# Patient Record
Sex: FEMALE | Race: OTHER | URBAN - METROPOLITAN AREA
[De-identification: names, ages, dates, MRNs, and addresses within clinical notes are randomized per-mention and may not be internally consistent; named-entity substitution may affect disease eponyms.]

---

## 2022-06-13 ENCOUNTER — HOSPITAL ENCOUNTER (OUTPATIENT)
Dept: LAB | Age: 27
Discharge: HOME | End: 2022-06-13

## 2022-06-13 ENCOUNTER — OFFICE VISIT (OUTPATIENT)
Dept: FAMILY MEDICINE CLINIC | Age: 27
End: 2022-06-13

## 2022-06-13 VITALS
TEMPERATURE: 97.8 F | BODY MASS INDEX: 33.31 KG/M2 | HEART RATE: 105 BPM | OXYGEN SATURATION: 99 % | HEIGHT: 62 IN | DIASTOLIC BLOOD PRESSURE: 76 MMHG | SYSTOLIC BLOOD PRESSURE: 137 MMHG | WEIGHT: 181 LBS

## 2022-06-13 DIAGNOSIS — K59.00 CONSTIPATION, UNSPECIFIED CONSTIPATION TYPE: ICD-10-CM

## 2022-06-13 DIAGNOSIS — R03.0 ELEVATED BLOOD PRESSURE READING: ICD-10-CM

## 2022-06-13 DIAGNOSIS — R53.83 FATIGUE, UNSPECIFIED TYPE: ICD-10-CM

## 2022-06-13 DIAGNOSIS — G43.909 MIGRAINE WITHOUT STATUS MIGRAINOSUS, NOT INTRACTABLE, UNSPECIFIED MIGRAINE TYPE: ICD-10-CM

## 2022-06-13 DIAGNOSIS — N91.2 AMENORRHEA: Primary | ICD-10-CM

## 2022-06-13 DIAGNOSIS — K21.9 GASTROESOPHAGEAL REFLUX DISEASE WITHOUT ESOPHAGITIS: ICD-10-CM

## 2022-06-13 DIAGNOSIS — M54.2 NECK PAIN: ICD-10-CM

## 2022-06-13 LAB
HCG URINE, QL. (POC): NEGATIVE
VALID INTERNAL CONTROL?: YES

## 2022-06-13 PROCEDURE — 81025 URINE PREGNANCY TEST: CPT | Performed by: FAMILY MEDICINE

## 2022-06-13 PROCEDURE — 80053 COMPREHEN METABOLIC PANEL: CPT

## 2022-06-13 PROCEDURE — 99204 OFFICE O/P NEW MOD 45 MIN: CPT | Performed by: FAMILY MEDICINE

## 2022-06-13 PROCEDURE — 84443 ASSAY THYROID STIM HORMONE: CPT

## 2022-06-13 PROCEDURE — 82306 VITAMIN D 25 HYDROXY: CPT

## 2022-06-13 PROCEDURE — 83036 HEMOGLOBIN GLYCOSYLATED A1C: CPT

## 2022-06-13 PROCEDURE — 85025 COMPLETE CBC W/AUTO DIFF WBC: CPT

## 2022-06-13 RX ORDER — CYCLOBENZAPRINE HCL 10 MG
10 TABLET ORAL
Qty: 14 TABLET | Refills: 0 | Status: SHIPPED | OUTPATIENT
Start: 2022-06-13 | End: 2022-10-26 | Stop reason: SDUPTHER

## 2022-06-13 RX ORDER — ATENOLOL 25 MG/1
25 TABLET ORAL DAILY
Qty: 30 TABLET | Refills: 0 | Status: SHIPPED | OUTPATIENT
Start: 2022-06-13 | End: 2022-10-26 | Stop reason: SDUPTHER

## 2022-06-13 RX ORDER — PANTOPRAZOLE SODIUM 40 MG/1
40 TABLET, DELAYED RELEASE ORAL
Qty: 30 TABLET | Refills: 0 | Status: SHIPPED | OUTPATIENT
Start: 2022-06-13 | End: 2022-09-20 | Stop reason: ALTCHOICE

## 2022-06-13 RX ORDER — POLYETHYLENE GLYCOL 3350 17 G/17G
17 POWDER, FOR SOLUTION ORAL DAILY
Qty: 510 G | Refills: 0 | Status: SHIPPED | OUTPATIENT
Start: 2022-06-13 | End: 2022-07-13

## 2022-06-13 NOTE — PROGRESS NOTES
Anne-Marie Hawk is a 23 y.o. female   Chief Complaint   Patient presents with    Headache     few years    Abdominal Pain     low area         ASSESSMENT AND PLAN:    1. Amenorrhea  6 month since last depo provera shot. HCG negative today. - AMB POC URINE PREGNANCY TEST, VISUAL COLOR COMPARISON    2. Migraine without status migrainosus, not intractable, unspecified migraine type  Suspect multifactorial headaches - does have migraneous features. Start atenolol for migraine prevention. Follow up in one month. - atenoloL (TENORMIN) 25 mg tablet; Take 1 Tablet by mouth daily. Indications: high blood pressure, migraine prevention  Dispense: 30 Tablet; Refill: 0    3. Elevated blood pressure reading  Mild elevation, so will choose beta blocker for migraine prevention for dual benefit. Discussed diet and exercise. - atenoloL (TENORMIN) 25 mg tablet; Take 1 Tablet by mouth daily. Indications: high blood pressure, migraine prevention  Dispense: 30 Tablet; Refill: 0    4. Neck pain  Also with significant neck tension and tightness which likely is contributing to her headache. Will give course of QHS flexeril. - cyclobenzaprine (FLEXERIL) 10 mg tablet; Take 1 Tablet by mouth nightly. Indications: muscle spasm  Dispense: 14 Tablet; Refill: 0    5. Gastroesophageal reflux disease without esophagitis  Reviewed diet changes. Start protonix. - pantoprazole (PROTONIX) 40 mg tablet; Take 1 Tablet by mouth Daily (before breakfast). Dispense: 30 Tablet; Refill: 0    6. Constipation, unspecified constipation type  Increase fiber and hydration. Miralax. - polyethylene glycol (MIRALAX) 17 gram/dose powder; Take 17 g by mouth daily for 30 days. Indications: constipation  Dispense: 510 g; Refill: 0    7. Fatigue, unspecified type  Labs today. Followup pending results. - CBC WITH AUTOMATED DIFF; Future  - HEMOGLOBIN A1C WITH EAG; Future  - METABOLIC PANEL, COMPREHENSIVE; Future  - TSH 3RD GENERATION;  Future  - VITAMIN D, 25 HYDROXY; Future      SUBJECTIVE:    HPI:  Kamilla Arias is a 23 y.o. female who presents with her friend and coworker with multiple symptoms. She has not been evaluated by a doctor basically since her daughter was born almost 5 years ago. She has multiple symptoms. The most bothersome is a daily headache that she has had for 3 years. She thinks stress and worry makes it worse. But also wonders if depo-provera is contributing. It is assocaited with nausea, photo and phonophobia, and blurry vision. She has occipital pain and neck pain -- but she also has unilateral throbbing pain. It changes side. She has also been very fatigued. She could fall asleep anywhere but doesn't sleep well at night. She has had lower abdominal pain, worse on the left side. She admits to constipation. She also has epigastric pain. She has occasional B/L chest pressure and palpitations. She admits she is an anxious person. She takes tylenol and ibuprofen for pain, which helps a little bit. PMH: none   PSH: none  MEDS: B12 injection last week. ALL: NKDA  SH: Denies  FH: GM: DM2  M: Headaches      Review of Systems   Constitutional: Positive for malaise/fatigue. Negative for fever and weight loss. Eyes: Positive for blurred vision and photophobia. Respiratory: Negative for cough and shortness of breath. Cardiovascular: Positive for palpitations. Negative for chest pain and leg swelling. Gastrointestinal: Positive for abdominal pain, constipation, heartburn and nausea. Negative for diarrhea and vomiting. Genitourinary: Negative for dysuria, flank pain, frequency, hematuria and urgency. Musculoskeletal: Positive for neck pain. Negative for back pain and myalgias. Neurological: Positive for headaches. Negative for dizziness, tingling and loss of consciousness. Psychiatric/Behavioral: The patient is nervous/anxious and has insomnia.           /76 (BP 1 Location: Left upper arm, BP Patient Position: Sitting)   Pulse (!) 105   Temp 97.8 °F (36.6 °C) (Temporal)   Ht 5' 2.21\" (1.58 m)   Wt 181 lb (82.1 kg)   SpO2 99%   BMI 32.89 kg/m²     Physical Exam  Constitutional:       General: She is not in acute distress. Appearance: Normal appearance. HENT:      Head: Normocephalic and atraumatic. Mouth/Throat:      Mouth: Mucous membranes are moist.      Pharynx: Oropharynx is clear. No oropharyngeal exudate or posterior oropharyngeal erythema. Eyes:      Extraocular Movements: Extraocular movements intact. Conjunctiva/sclera: Conjunctivae normal.      Pupils: Pupils are equal, round, and reactive to light. Cardiovascular:      Rate and Rhythm: Normal rate and regular rhythm. Pulses: Normal pulses. Heart sounds: Normal heart sounds. Pulmonary:      Effort: Pulmonary effort is normal.      Breath sounds: Normal breath sounds. Abdominal:      General: Bowel sounds are normal. There is no distension. Palpations: Abdomen is soft. Tenderness: There is no abdominal tenderness. Musculoskeletal:         General: Normal range of motion. Cervical back: No tenderness. Right lower leg: No edema. Left lower leg: No edema. Lymphadenopathy:      Cervical: No cervical adenopathy. Skin:     General: Skin is warm. Neurological:      Mental Status: She is alert and oriented to person, place, and time. Cranial Nerves: No cranial nerve deficit. Sensory: Sensation is intact. Motor: Motor function is intact. Coordination: Coordination is intact.       Gait: Gait normal.      Deep Tendon Reflexes: Reflexes normal.   Psychiatric:         Behavior: Behavior normal.

## 2022-06-13 NOTE — PROGRESS NOTES
An After Visit Summary was printed and given to the patient. Discharge medications reviewed with patient and appropriate educational materials and side effects teaching were provided. Lab req and directions to draw site provided to patient. Goodrx coupon provided and RN explained use. Time for questions and answers provided, patient verbalized understanding. Patient discharged from clinic in stable condition. MEAGHAN  assisted with this d/c.

## 2022-06-14 LAB
25(OH)D3 SERPL-MCNC: 25.5 NG/ML (ref 30–100)
ALBUMIN SERPL-MCNC: 4.2 G/DL (ref 3.5–5)
ALBUMIN/GLOB SERPL: 1.3 {RATIO} (ref 1.1–2.2)
ALP SERPL-CCNC: 98 U/L (ref 45–117)
ALT SERPL-CCNC: 26 U/L (ref 12–78)
ANION GAP SERPL CALC-SCNC: 8 MMOL/L (ref 5–15)
AST SERPL-CCNC: 22 U/L (ref 15–37)
BASOPHILS # BLD: 0 K/UL (ref 0–0.1)
BASOPHILS NFR BLD: 0 % (ref 0–1)
BILIRUB SERPL-MCNC: 0.3 MG/DL (ref 0.2–1)
BUN SERPL-MCNC: 10 MG/DL (ref 6–20)
BUN/CREAT SERPL: 13 (ref 12–20)
CALCIUM SERPL-MCNC: 9.2 MG/DL (ref 8.5–10.1)
CHLORIDE SERPL-SCNC: 109 MMOL/L (ref 97–108)
CO2 SERPL-SCNC: 24 MMOL/L (ref 21–32)
CREAT SERPL-MCNC: 0.79 MG/DL (ref 0.55–1.02)
DIFFERENTIAL METHOD BLD: NORMAL
EOSINOPHIL # BLD: 0.1 K/UL (ref 0–0.4)
EOSINOPHIL NFR BLD: 2 % (ref 0–7)
ERYTHROCYTE [DISTWIDTH] IN BLOOD BY AUTOMATED COUNT: 12.6 % (ref 11.5–14.5)
EST. AVERAGE GLUCOSE BLD GHB EST-MCNC: 94 MG/DL
GLOBULIN SER CALC-MCNC: 3.2 G/DL (ref 2–4)
GLUCOSE SERPL-MCNC: 89 MG/DL (ref 65–100)
HBA1C MFR BLD: 4.9 % (ref 4–5.6)
HCT VFR BLD AUTO: 41.7 % (ref 35–47)
HGB BLD-MCNC: 13.5 G/DL (ref 11.5–16)
IMM GRANULOCYTES # BLD AUTO: 0 K/UL (ref 0–0.04)
IMM GRANULOCYTES NFR BLD AUTO: 0 % (ref 0–0.5)
LYMPHOCYTES # BLD: 1.8 K/UL (ref 0.8–3.5)
LYMPHOCYTES NFR BLD: 22 % (ref 12–49)
MCH RBC QN AUTO: 29.3 PG (ref 26–34)
MCHC RBC AUTO-ENTMCNC: 32.4 G/DL (ref 30–36.5)
MCV RBC AUTO: 90.7 FL (ref 80–99)
MONOCYTES # BLD: 0.7 K/UL (ref 0–1)
MONOCYTES NFR BLD: 8 % (ref 5–13)
NEUTS SEG # BLD: 5.4 K/UL (ref 1.8–8)
NEUTS SEG NFR BLD: 68 % (ref 32–75)
NRBC # BLD: 0 K/UL (ref 0–0.01)
NRBC BLD-RTO: 0 PER 100 WBC
PLATELET # BLD AUTO: 237 K/UL (ref 150–400)
PMV BLD AUTO: 11.9 FL (ref 8.9–12.9)
POTASSIUM SERPL-SCNC: 4 MMOL/L (ref 3.5–5.1)
PROT SERPL-MCNC: 7.4 G/DL (ref 6.4–8.2)
RBC # BLD AUTO: 4.6 M/UL (ref 3.8–5.2)
SODIUM SERPL-SCNC: 141 MMOL/L (ref 136–145)
TSH SERPL DL<=0.05 MIU/L-ACNC: 3.92 UIU/ML (ref 0.36–3.74)
WBC # BLD AUTO: 8 K/UL (ref 3.6–11)

## 2022-06-15 NOTE — PROGRESS NOTES
Normal CBC, A1C, CMP    Mild TSH elevation  Vit D insufficiency. Attempted to call x3, no answer. Will discuss with patient at follow up next month.

## 2022-06-21 ENCOUNTER — TELEPHONE (OUTPATIENT)
Dept: FAMILY MEDICINE CLINIC | Age: 27
End: 2022-06-21

## 2022-06-21 DIAGNOSIS — E55.9 VITAMIN D DEFICIENCY: ICD-10-CM

## 2022-06-21 DIAGNOSIS — G43.909 MIGRAINE WITHOUT STATUS MIGRAINOSUS, NOT INTRACTABLE, UNSPECIFIED MIGRAINE TYPE: Primary | ICD-10-CM

## 2022-06-21 RX ORDER — NAPROXEN 500 MG/1
500 TABLET ORAL 2 TIMES DAILY WITH MEALS
Qty: 30 TABLET | Refills: 1 | Status: SHIPPED | OUTPATIENT
Start: 2022-06-21

## 2022-06-21 RX ORDER — MELATONIN
1000 DAILY
Qty: 90 TABLET | Refills: 1 | Status: SHIPPED | OUTPATIENT
Start: 2022-06-21

## 2022-06-21 NOTE — TELEPHONE ENCOUNTER
While reviewing her daughter's lab results, she asked about hers. I had tried to contact her previously. We reviewed her results. She reports she is still having headaches. She's taking atenolol for prevention  And has taken flexeril QHS when it's bad. No abortive med. Needs pap at next visit. Worried about some \"ovarian\" pain and she still hasn't gotten her period. Has follow up already scheduled in about a month. 1. Migraine without status migrainosus, not intractable, unspecified migraine type  - naproxen (NAPROSYN) 500 mg tablet; Take 1 Tablet by mouth two (2) times daily (with meals). Dispense: 30 Tablet; Refill: 1    2. Vitamin D deficiency  - cholecalciferol (VITAMIN D3) (1000 Units /25 mcg) tablet; Take 1 Tablet by mouth daily. Dispense: 90 Tablet;  Refill: 1

## 2022-09-20 ENCOUNTER — OFFICE VISIT (OUTPATIENT)
Dept: FAMILY MEDICINE CLINIC | Age: 27
End: 2022-09-20

## 2022-09-20 VITALS
SYSTOLIC BLOOD PRESSURE: 130 MMHG | DIASTOLIC BLOOD PRESSURE: 84 MMHG | TEMPERATURE: 97.5 F | WEIGHT: 182.4 LBS | BODY MASS INDEX: 33.14 KG/M2 | HEART RATE: 87 BPM | OXYGEN SATURATION: 97 %

## 2022-09-20 DIAGNOSIS — K21.9 GASTROESOPHAGEAL REFLUX DISEASE WITHOUT ESOPHAGITIS: Primary | ICD-10-CM

## 2022-09-20 DIAGNOSIS — R10.2 PELVIC PAIN: ICD-10-CM

## 2022-09-20 PROCEDURE — 99214 OFFICE O/P EST MOD 30 MIN: CPT | Performed by: FAMILY MEDICINE

## 2022-09-20 RX ORDER — OMEPRAZOLE 40 MG/1
40 CAPSULE, DELAYED RELEASE ORAL
Qty: 30 CAPSULE | Refills: 1 | Status: SHIPPED | OUTPATIENT
Start: 2022-09-20 | End: 2022-09-30 | Stop reason: ALTCHOICE

## 2022-09-20 NOTE — PROGRESS NOTES
Coordination of Care  1. Have you been to the ER, urgent care clinic since your last visit? Hospitalized since your last visit? No    2. Have you seen or consulted any other health care providers outside of the [address] [place] [address] since your last visit? Include any pap smears or colon screening. No    Does the patient need refills? YES    Learning Assessment Complete?  yes  Depression Screening complete in the past 12 months? yes

## 2022-09-20 NOTE — PROGRESS NOTES
AVS printed and with the assistance of Slovak interpretor, Jose Martin Velazquez, reviewed with patient. Per provider's recommendations, patient advised that Omeprazole 40mg #30 was sent to [facility] and that the retail cost was $15 so no GoodRx coupon needed. Patient advised to return for recheck of pelvic pain per provider. Supplies for h-pylori testing given to patient and instructed how to collect sample. Patient indicated understanding and appreciated the service today.

## 2022-09-20 NOTE — PROGRESS NOTES
Nitin Mckeon is a 23 y.o. female   Chief Complaint   Patient presents with    Indigestion     Patient complains of gastritis pain, bloating when eating. Recurrent headaches          ASSESSMENT AND PLAN:    1. Gastroesophageal reflux disease without esophagitis  Switch pantoprazole to omeprazole. Check H Pylori. Continue dietary modifications  - H PYLORI AG, STOOL; Future  - omeprazole (PRILOSEC) 40 mg capsule; Take 1 Capsule by mouth Daily (before breakfast). Indications: gastroesophageal reflux disease  Dispense: 30 Capsule; Refill: 1    2. Pelvic pain  Offered pelvic ultrasound, but patient is unable to travel far (has to pay someone to bring her) and is worried about the cost.  Would prefer to defer for now. Will return for pap smear and labs (labs not available here [facility]) today). SUBJECTIVE:    HPI:  Kamilla Plata is a 23 y.o. female who presents  for followup. She was last seen 6/13 with several complaints, primarily headaches and abdominal discomfort. She reports the headaches are much improved. They occur rarely and are milder. She was on atenolol for prevention and naproxen. She is not currently taking it. She is most worried about her epigastric pain. She feels like it's a sensation of emptiness, almost hunger even if she's already eating. She has a lot of bloating and gas. She has a bitter taste in her mouth. She is often nauseated. She has changed her diet. She stopped coffee and acidic fruits. She does not drink alcohol. No spicy foods. She took the pantoprazole but ran out. She thinks it helped a little. She is also concerned about her menstruation. Her period has been irregular. She is not using contraception. She has some spotting a month ago. Since her daughter was born she has had painful menstruation. And now, even when she isn't menstruating she has pelvic pain. Sometimes she feels like something is trying to push out of her uterus.  No incontinence. Rare dysuria. No frequency or urgency. She has occasional pain with defecation. No constipation. Occasional diarrhea. No blood in stool. Due for pap smear. Review of Systems   Constitutional:  Positive for malaise/fatigue. Negative for fever. Eyes:  Negative for blurred vision. Respiratory:  Negative for cough and shortness of breath. Cardiovascular:  Negative for chest pain, palpitations and leg swelling. Gastrointestinal:  Positive for abdominal pain, heartburn and nausea. Negative for blood in stool, constipation, diarrhea, melena and vomiting. Genitourinary: Negative. Neurological:  Negative for dizziness and headaches. /84 (BP 1 Location: Left upper arm, BP Patient Position: Sitting, BP Cuff Size: Adult)   Pulse 87   Temp 97.5 °F (36.4 °C) (Temporal)   Wt 182 lb 6.4 oz (82.7 kg)   LMP 08/15/2022 (Approximate) Comment: irregular periods  SpO2 97%   BMI 33.14 kg/m²     Physical Exam  Constitutional:       General: She is not in acute distress. Appearance: Normal appearance. HENT:      Mouth/Throat:      Mouth: Mucous membranes are moist.      Pharynx: No oropharyngeal exudate or posterior oropharyngeal erythema. Eyes:      Extraocular Movements: Extraocular movements intact. Conjunctiva/sclera: Conjunctivae normal.      Pupils: Pupils are equal, round, and reactive to light. Cardiovascular:      Rate and Rhythm: Normal rate and regular rhythm. Heart sounds: Normal heart sounds. Pulmonary:      Effort: Pulmonary effort is normal.      Breath sounds: Normal breath sounds. Abdominal:      General: Bowel sounds are normal. There is no distension. Tenderness: There is abdominal tenderness (epigastrium. B/L pelvis. suprapubic). There is no right CVA tenderness, left CVA tenderness or guarding. Neurological:      Mental Status: She is alert.

## 2022-09-27 ENCOUNTER — HOSPITAL ENCOUNTER (OUTPATIENT)
Dept: LAB | Age: 27
Discharge: HOME | End: 2022-09-27

## 2022-09-27 ENCOUNTER — LAB ONLY (OUTPATIENT)
Dept: FAMILY MEDICINE CLINIC | Age: 27
End: 2022-09-27

## 2022-09-27 DIAGNOSIS — K21.9 GASTROESOPHAGEAL REFLUX DISEASE WITHOUT ESOPHAGITIS: ICD-10-CM

## 2022-09-27 PROCEDURE — 87338 HPYLORI STOOL AG IA: CPT

## 2022-09-27 PROCEDURE — 36415 COLL VENOUS BLD VENIPUNCTURE: CPT

## 2022-09-27 NOTE — PROGRESS NOTES
Kamilla Lauren presents for lab drop off ordered by:    Ordering Provider: Lisseth Mckenna MD  Ordering Department/Practice:  [facility]    Date Ordered: 09/20/22  Date Collected: 09/27/22    Lab drop off collected by Libia Correia RN

## 2022-09-30 ENCOUNTER — PATIENT MESSAGE (OUTPATIENT)
Dept: FAMILY MEDICINE CLINIC | Age: 27
End: 2022-09-30

## 2022-09-30 DIAGNOSIS — A04.8 H. PYLORI INFECTION: Primary | ICD-10-CM

## 2022-09-30 LAB
H PYLORI AG STL QL IA: POSITIVE
SPECIMEN SOURCE: ABNORMAL

## 2022-09-30 RX ORDER — PANTOPRAZOLE SODIUM 40 MG/1
40 TABLET, DELAYED RELEASE ORAL 2 TIMES DAILY
Qty: 28 TABLET | Refills: 0 | Status: SHIPPED | OUTPATIENT
Start: 2022-09-30 | End: 2022-10-14

## 2022-09-30 RX ORDER — CLARITHROMYCIN 500 MG/1
500 TABLET, FILM COATED ORAL 2 TIMES DAILY
Qty: 28 TABLET | Refills: 0 | Status: SHIPPED | OUTPATIENT
Start: 2022-09-30 | End: 2022-10-14

## 2022-09-30 RX ORDER — AMOXICILLIN 500 MG/1
500 CAPSULE ORAL 2 TIMES DAILY
Qty: 28 CAPSULE | Refills: 0 | Status: SHIPPED | OUTPATIENT
Start: 2022-09-30 | End: 2022-10-14

## 2022-09-30 NOTE — TELEPHONE ENCOUNTER
Discussed +H Pylori and treatment. Additionally, on her myChart account she has seen that there is a charge and she doesn't know why. No bills have arrived to her house. 1. H. pylori infection    - amoxicillin (AMOXIL) 500 mg capsule; Take 1 Capsule by mouth two (2) times a day for 14 days. Indications: inflammation of the stomach lining caused by H. pylori  Dispense: 28 Capsule; Refill: 0  - clarithromycin (BIAXIN) 500 mg tablet; Take 1 Tablet by mouth two (2) times a day for 14 days. Indications: inflammation of the stomach lining caused by H. pylori  Dispense: 28 Tablet; Refill: 0  - pantoprazole (PROTONIX) 40 mg tablet; Take 1 Tablet by mouth two (2) times a day for 14 days. Indications: inflammation of the stomach lining caused by H. pylori  Dispense: 28 Tablet; Refill: 0      We'll address the billing issue w/ OW at followup.

## 2022-10-11 ENCOUNTER — OFFICE VISIT (OUTPATIENT)
Dept: FAMILY MEDICINE CLINIC | Age: 27
End: 2022-10-11

## 2022-10-11 VITALS
SYSTOLIC BLOOD PRESSURE: 134 MMHG | HEART RATE: 98 BPM | OXYGEN SATURATION: 99 % | BODY MASS INDEX: 33.07 KG/M2 | TEMPERATURE: 98.1 F | DIASTOLIC BLOOD PRESSURE: 82 MMHG | WEIGHT: 182 LBS

## 2022-10-11 DIAGNOSIS — A04.8 H. PYLORI INFECTION: ICD-10-CM

## 2022-10-11 DIAGNOSIS — Z23 ENCOUNTER FOR IMMUNIZATION: Primary | ICD-10-CM

## 2022-10-11 DIAGNOSIS — R03.0 BORDERLINE BLOOD PRESSURE: ICD-10-CM

## 2022-10-11 DIAGNOSIS — Z71.89 COUNSELING AND COORDINATION OF CARE: Primary | ICD-10-CM

## 2022-10-11 PROCEDURE — 99080 SPECIAL REPORTS OR FORMS: CPT | Performed by: PHYSICIAN ASSISTANT

## 2022-10-11 PROCEDURE — 90686 IIV4 VACC NO PRSV 0.5 ML IM: CPT

## 2022-10-11 PROCEDURE — 99214 OFFICE O/P EST MOD 30 MIN: CPT | Performed by: FAMILY MEDICINE

## 2022-10-11 PROCEDURE — 90471 IMMUNIZATION ADMIN: CPT

## 2022-10-11 NOTE — PROGRESS NOTES
Name and  verified, flu vaccine administered after confirming understanding of VIS, consent, and no contraindications. RN described allergic reactions and when to seek emergency care. Vaccine administered in L Delt IM. No adverse reactions noted after 15 min.  #[id number] assisted.

## 2022-10-11 NOTE — PROGRESS NOTES
1. Have you been to the ER, urgent care clinic since your last visit? Hospitalized since your last visit? No    2. Have you seen or consulted any other health care providers outside of the [address] [place] [address] since your last visit? Include any pap smears or colon screening.  No  Chief Complaint   Patient presents with    Pelvic Pain     Follow up     GERD     Follow up

## 2022-10-11 NOTE — PROGRESS NOTES
Trent Yuan is a 23 y.o. female   Chief Complaint   Patient presents with    Pelvic Pain     Follow up     GERD     Follow up         ASSESSMENT AND PLAN:    1. Encounter for immunization  - INFLUENZA, FLUARIX, FLULAVAL, FLUZONE (AGE 6 MO+), AFLURIA(AGE 3Y+) IM, PF, 0.5 ML    2. Borderline blood pressure  Discussed dietary adjustments. 3. H. pylori infection  Complete treatment course. If symptoms persist, will refer to GI. Can try taking pantoprazole on an empty stomach, then taking abx with food. SUBJECTIVE:    HPI:  Kamilla Rosario is a 23 y.o. female who presents  for follow up on GERD symptoms. Her H.Pylori was positive. She was prescribed triple therapy. She took it for several days but felt it was making her gastritis worse so she stopped taking it. It is still bad, and prevents her from sleeping. We reviewed her labs. We discussed diet. Review of Systems   Constitutional:  Negative for fever and malaise/fatigue. Eyes:  Negative for blurred vision. Respiratory:  Negative for cough and shortness of breath. Cardiovascular:  Negative for chest pain, palpitations and leg swelling. Gastrointestinal:  Positive for abdominal pain, heartburn and nausea. Negative for blood in stool, constipation, diarrhea, melena and vomiting. Neurological:  Negative for dizziness and headaches. /82 (BP 1 Location: Right arm, BP Patient Position: Sitting, BP Cuff Size: Adult)   Pulse 98   Temp 98.1 °F (36.7 °C) (Temporal)   Wt 182 lb (82.6 kg)   LMP 09/21/2022 (Approximate) Comment: Lasted 3 days  SpO2 99%   BMI 33.07 kg/m²     Physical Exam  Constitutional:       General: She is not in acute distress. Appearance: Normal appearance. Eyes:      Extraocular Movements: Extraocular movements intact. Conjunctiva/sclera: Conjunctivae normal.      Pupils: Pupils are equal, round, and reactive to light.    Cardiovascular:      Rate and Rhythm: Normal rate and regular rhythm. Heart sounds: Normal heart sounds. Pulmonary:      Effort: Pulmonary effort is normal.      Breath sounds: Normal breath sounds. Abdominal:      General: Bowel sounds are normal. There is no distension. Tenderness: There is abdominal tenderness (epigastric). There is no guarding or rebound. Neurological:      Mental Status: She is alert.

## 2022-10-11 NOTE — PROGRESS NOTES
Assisted patient with bills. Pending partner's POI. OW provided self attestation letter for partner to fill out. Patient has been working with [employer]. Patient will bring POI to F. advisor at [facility]. SDOH screening completed. Patient opted out.

## 2022-10-11 NOTE — PROGRESS NOTES
Verified name and . An AVS was printed and given to the patient. Reviewed all discharge instructions, including: information concerning a low-salt diet, continuing medications, possible side effects, and f/up appt. Pt brought a bill that she received from [address] [place] [address] bill is for the amount of $190.80 but has service date of 22 and a description of the [facility] appt at [facility]. Aug with Dr. Ez Juarez at that time. ORW not available in this moment, so a message was sent to the ORW and a copy of the bill was made to show her. Influenza vaccine requested by patient. VIIS historical immunizations reviewed and consent form obtained. VIS given to patient and possible side effects explained, including those which would warrant emergent evaluation. Patient denied fever, allergies, and previous immunization reactions. Immunization to be administered by Rob Yanez RN. Allowed time for questions and patient verbalized understanding to all given instructions.

## 2022-10-26 ENCOUNTER — VIRTUAL VISIT (OUTPATIENT)
Dept: FAMILY MEDICINE CLINIC | Age: 27
End: 2022-10-26

## 2022-10-26 DIAGNOSIS — G43.909 MIGRAINE WITHOUT STATUS MIGRAINOSUS, NOT INTRACTABLE, UNSPECIFIED MIGRAINE TYPE: Primary | ICD-10-CM

## 2022-10-26 DIAGNOSIS — A04.8 H. PYLORI INFECTION: ICD-10-CM

## 2022-10-26 DIAGNOSIS — M54.2 NECK PAIN: ICD-10-CM

## 2022-10-26 PROCEDURE — 99213 OFFICE O/P EST LOW 20 MIN: CPT | Performed by: FAMILY MEDICINE

## 2022-10-26 RX ORDER — CLARITHROMYCIN 500 MG/1
500 TABLET, FILM COATED ORAL DAILY
COMMUNITY

## 2022-10-26 RX ORDER — ATENOLOL 25 MG/1
25 TABLET ORAL DAILY
Qty: 90 TABLET | Refills: 0 | Status: SHIPPED | OUTPATIENT
Start: 2022-10-26

## 2022-10-26 RX ORDER — CYCLOBENZAPRINE HCL 10 MG
10 TABLET ORAL
Qty: 30 TABLET | Refills: 0 | Status: SHIPPED | OUTPATIENT
Start: 2022-10-26

## 2022-10-26 NOTE — PROGRESS NOTES
Lexie Sherwood is a 33 y.o. female   Chief Complaint   Patient presents with    Counseling     Pt reports that she has been struggling with symptoms of depression over the last two weeks. Abdominal Pain     Pt reports symptoms of gastritis and a positive H. Pylori test. She has been taking her medication her symptoms are resolving. Pt states that she has only been taking the clarithromycin once a day because of her \"gastritis\" and a \"bitter taste. \"     Medication Refill     Pt reports that she needs a refill for her atenolol and flexeril.     >>>>>>>>>>>>>TELEPHONE ENCOUNTER<<<<<<<<<<<<<<<<<<<<      ASSESSMENT AND PLAN:    1. Migraine without status migrainosus, not intractable, unspecified migraine type  Continue atenolol for ppx.    - atenoloL (TENORMIN) 25 mg tablet; Take 1 Tablet by mouth daily. Indications: high blood pressure, migraine prevention  Dispense: 90 Tablet; Refill: 0    2. H. pylori infection  Complete clarithyromycin. Repeat H.Pylori testing at followup for JERRY. 3. Neck pain  Will resume flexeril PRN for neck tension, which seems to trigger HA>  - cyclobenzaprine (FLEXERIL) 10 mg tablet; Take 1 Tablet by mouth nightly as needed for Muscle Spasm(s) (tension muscular). Indications: muscle spasm  Dispense: 30 Tablet; Refill: 0      SUBJECTIVE:    HPI:  Kamilla SKrys Asia Solano is a 33 y.o. female who presents for followup. She sort of completed her H.Pylori treatment yesterday. She only took the Clarithromycin in the morning instead of BID because she found when she took it at night she'd have worse gastritis symptoms and wouldn't be able to sleep. She has 11 pills left. They do not bother her stomach in the morning. For 2 days she has had neck tension and she is having a headache with eye pressure, blurry vision and scotomata. The atenolol has decreased the frequency and severity.   Flexeril helped in the past for her neck tension which seems to be associated with the headaches. She has been feeling a little down over the past weeks but she thinks it is from being in pain (abdominal pain now headache again)      Review of Systems   Constitutional:  Negative for fever and malaise/fatigue. Eyes:  Positive for blurred vision and pain. Negative for photophobia and discharge. Respiratory:  Negative for cough and shortness of breath. Cardiovascular:  Negative for chest pain, palpitations and leg swelling. Gastrointestinal:  Positive for heartburn. Negative for abdominal pain, constipation, diarrhea, nausea and vomiting. Musculoskeletal:  Positive for neck pain. Neurological:  Positive for headaches. Negative for dizziness. Psychiatric/Behavioral:  Positive for depression. Negative for suicidal ideas. The patient does not have insomnia. There were no vitals taken for this visit. Physical Exam - telephone encounter.

## 2022-10-26 NOTE — PROGRESS NOTES
Pt reports that she does not have access to VS equipment at this time. [facility]  [id number] assisted with this call. Coordination of Care  1. Have you been to the ER, urgent care clinic since your last visit? Hospitalized since your last visit? No    2. Have you seen or consulted any other health care providers outside of the [address] [place] [address] since your last visit? Include any pap smears or colon screening. No    Does the patient need refills? YES    Learning Assessment Complete?  yes  Depression Screening complete in the past 12 months? yes

## 2022-10-26 NOTE — PROGRESS NOTES
Name and  confirmed w/ patient. All discharge instructions were reviewed with the patient including: f/up appt and refills and goodrx. Time for questions and answers provided, patient verbalized understanding. [facility]  #[id number] assisted with this d/c.

## 2023-10-04 ENCOUNTER — HOSPITAL ENCOUNTER (OUTPATIENT)
Facility: HOSPITAL | Age: 28
Setting detail: SPECIMEN
Discharge: HOME | End: 2023-10-07

## 2023-10-04 ENCOUNTER — OFFICE VISIT (OUTPATIENT)
Age: 28
End: 2023-10-04

## 2023-10-04 VITALS
OXYGEN SATURATION: 100 % | SYSTOLIC BLOOD PRESSURE: 122 MMHG | BODY MASS INDEX: 29.63 KG/M2 | HEIGHT: 62 IN | TEMPERATURE: 98.1 F | HEART RATE: 77 BPM | WEIGHT: 161 LBS | DIASTOLIC BLOOD PRESSURE: 81 MMHG

## 2023-10-04 DIAGNOSIS — Z01.419 WELL WOMAN EXAM: ICD-10-CM

## 2023-10-04 DIAGNOSIS — N92.6 IRREGULAR PERIODS: ICD-10-CM

## 2023-10-04 DIAGNOSIS — R10.2 PELVIC PAIN: Primary | ICD-10-CM

## 2023-10-04 DIAGNOSIS — E66.3 OVERWEIGHT: ICD-10-CM

## 2023-10-04 LAB
ALBUMIN SERPL-MCNC: 4.3 G/DL (ref 3.5–5)
ALBUMIN/GLOB SERPL: 1.5 (ref 1.1–2.2)
ALP SERPL-CCNC: 75 U/L (ref 45–117)
ALT SERPL-CCNC: 20 U/L (ref 12–78)
ANION GAP SERPL CALC-SCNC: 5 MMOL/L (ref 5–15)
AST SERPL-CCNC: 13 U/L (ref 15–37)
BILIRUB SERPL-MCNC: 0.4 MG/DL (ref 0.2–1)
BUN SERPL-MCNC: 13 MG/DL (ref 6–20)
BUN/CREAT SERPL: 16 (ref 12–20)
CALCIUM SERPL-MCNC: 9.1 MG/DL (ref 8.5–10.1)
CHLORIDE SERPL-SCNC: 107 MMOL/L (ref 97–108)
CO2 SERPL-SCNC: 25 MMOL/L (ref 21–32)
COMMENT:: NORMAL
CREAT SERPL-MCNC: 0.79 MG/DL (ref 0.55–1.02)
GLOBULIN SER CALC-MCNC: 2.9 G/DL (ref 2–4)
GLUCOSE SERPL-MCNC: 90 MG/DL (ref 65–100)
HCG SERPL QL: NEGATIVE
POTASSIUM SERPL-SCNC: 3.8 MMOL/L (ref 3.5–5.1)
PROT SERPL-MCNC: 7.2 G/DL (ref 6.4–8.2)
SODIUM SERPL-SCNC: 137 MMOL/L (ref 136–145)
SPECIMEN HOLD: NORMAL
TSH SERPL DL<=0.05 MIU/L-ACNC: 2.37 UIU/ML (ref 0.36–3.74)

## 2023-10-04 PROCEDURE — 87591 N.GONORRHOEAE DNA AMP PROB: CPT

## 2023-10-04 PROCEDURE — 83036 HEMOGLOBIN GLYCOSYLATED A1C: CPT

## 2023-10-04 PROCEDURE — 99213 OFFICE O/P EST LOW 20 MIN: CPT | Performed by: PHYSICIAN ASSISTANT

## 2023-10-04 PROCEDURE — 84703 CHORIONIC GONADOTROPIN ASSAY: CPT

## 2023-10-04 PROCEDURE — 84443 ASSAY THYROID STIM HORMONE: CPT

## 2023-10-04 PROCEDURE — 80053 COMPREHEN METABOLIC PANEL: CPT

## 2023-10-04 PROCEDURE — 88142 CYTOPATH C/V THIN LAYER: CPT

## 2023-10-04 PROCEDURE — 87491 CHLMYD TRACH DNA AMP PROBE: CPT

## 2023-10-04 PROCEDURE — 36415 COLL VENOUS BLD VENIPUNCTURE: CPT

## 2023-10-04 ASSESSMENT — PATIENT HEALTH QUESTIONNAIRE - PHQ9
1. LITTLE INTEREST OR PLEASURE IN DOING THINGS: 0
SUM OF ALL RESPONSES TO PHQ QUESTIONS 1-9: 0
2. FEELING DOWN, DEPRESSED OR HOPELESS: 0
SUM OF ALL RESPONSES TO PHQ9 QUESTIONS 1 & 2: 0
SUM OF ALL RESPONSES TO PHQ QUESTIONS 1-9: 0

## 2023-10-04 NOTE — PROGRESS NOTES
[address] seen at d/c, full name and  verified. After Visit Summary provided and all discharge instructions reviewed. Patient to keep follow up appointment scheduled for 23. Medication list reviewed. Assisted patient in scheduling an ultrasound appointment at [facility] for 10/10/23 at 2:00 pm. Advised patient to arrive 30 minutes early with a full bladder, advised patient to drink 32 oz of clear liquid (water) an hour prior to appointment. Used the teach-back method to verify understanding, patient verbalizes understanding. Advised patient to call [phone] phone line to schedule an appointment with an  to go over the care-card financial assistance application process. Opportunity for questions provided, patient denies any questions.  Amy Arreguin RN

## 2023-10-04 NOTE — PROGRESS NOTES
Assessment/Plan:    Jose Nogueira was seen today for pelvic pain. Diagnoses and all orders for this visit:    Pelvic pain  -     Chlamydia/Neisseria by URIEL W/Reflex Confirm; Future  -     US PELVIS COMPLETE; Future  -     US NON OB TRANSVAGINAL; Future    Overweight  -     Hemoglobin A1C; Future  -     Comprehensive Metabolic Panel; Future    Well woman exam  -     PAP, Liquid Based, Manual Screen; Future    Irregular periods  -     HCG Qualitative, Serum; Future  -     TSH; Future    She has scheduled f/up in  to discuss the results     No follow-up provider specified. YULIANA Watson expressed understanding of this plan. An AVS was printed and given to the patient.      ----------------------------------------------------------------------    Chief Complaint   Patient presents with    Pelvic Pain     Patient c/o pelvic pain and cramping x6 years but lately pain is worse       History of Present Illness:  [facility] with 6 years of 2-3 times weekly pelvic pain. In the past few months, the pain has become a little more severe. The pain feels like dilation of cervix during labor. She is g32 , 11year old  She has [place] a pap before [place]. She had a vaginal infection but no problem with the pap  She is with her sole lifetime partner. She has no concern for extra-marital relationships  She has irregular periods. She was using depo monthly injections until about 8 months ago and stopped using them. She is not on birth control now but states that she is \"not pregnant\"  She sometimes has pain with intercourse but not often (no CMT on exam)      No past medical history on file. Current Outpatient Medications   Medication Sig Dispense Refill    atenolol (TENORMIN) 25 MG tablet Take 1 tablet by mouth daily (Patient not taking: Reported on 10/4/2023)       No current facility-administered medications for this visit.        No Known Allergies    Social

## 2023-10-04 NOTE — PROGRESS NOTES
Coordination of Care  1. Have you been to the ER, urgent care clinic since your last visit? Hospitalized since your last visit? No    2. Have you seen or consulted any other health care providers outside of the [address] [place] [address] since your last visit? Include any pap smears or colon screening. No  Does the patient need refills? No    Learning Assessment Complete?  yes  Depression Screening complete in the past 12 months? yes

## 2023-10-05 LAB
EST. AVERAGE GLUCOSE BLD GHB EST-MCNC: 97 MG/DL
HBA1C MFR BLD: 5 % (ref 4–5.6)

## 2023-10-07 LAB
C TRACH RRNA SPEC QL NAA+PROBE: NEGATIVE
N GONORRHOEA RRNA SPEC QL NAA+PROBE: NEGATIVE
SPECIMEN SOURCE: NORMAL

## 2023-10-10 ENCOUNTER — HOSPITAL ENCOUNTER (OUTPATIENT)
Facility: HOSPITAL | Age: 28
Discharge: HOME | End: 2023-10-13

## 2023-10-10 DIAGNOSIS — R10.2 PELVIC PAIN: ICD-10-CM

## 2023-10-10 PROCEDURE — 76830 TRANSVAGINAL US NON-OB: CPT

## 2023-10-10 PROCEDURE — 76856 US EXAM PELVIC COMPLETE: CPT

## 2023-10-26 ENCOUNTER — OFFICE VISIT (OUTPATIENT)
Age: 28
End: 2023-10-26

## 2023-10-26 DIAGNOSIS — Z71.89 COUNSELING AND COORDINATION OF CARE: Primary | ICD-10-CM

## 2023-10-26 PROCEDURE — 99080 SPECIAL REPORTS OR FORMS: CPT | Performed by: PHYSICIAN ASSISTANT

## 2023-10-26 SDOH — ECONOMIC STABILITY: TRANSPORTATION INSECURITY
IN THE PAST 12 MONTHS, HAS THE LACK OF TRANSPORTATION KEPT YOU FROM MEDICAL APPOINTMENTS OR FROM GETTING MEDICATIONS?: NO

## 2023-10-26 SDOH — ECONOMIC STABILITY: TRANSPORTATION INSECURITY
IN THE PAST 12 MONTHS, HAS LACK OF TRANSPORTATION KEPT YOU FROM MEETINGS, WORK, OR FROM GETTING THINGS NEEDED FOR DAILY LIVING?: YES

## 2023-10-26 ASSESSMENT — SOCIAL DETERMINANTS OF HEALTH (SDOH): HOW HARD IS IT FOR YOU TO PAY FOR THE VERY BASICS LIKE FOOD, HOUSING, MEDICAL CARE, AND HEATING?: SOMEWHAT HARD

## 2023-10-26 NOTE — PROGRESS NOTES
BS Financial Assistance application has been completed.    Patient will bring it to a Financial Counselor at the hospital.    Food resources were given to patient, as per her request.

## 2023-11-13 ENCOUNTER — OFFICE VISIT (OUTPATIENT)
Age: 28
End: 2023-11-13

## 2023-11-13 VITALS
DIASTOLIC BLOOD PRESSURE: 74 MMHG | WEIGHT: 160.6 LBS | SYSTOLIC BLOOD PRESSURE: 115 MMHG | BODY MASS INDEX: 29.55 KG/M2 | RESPIRATION RATE: 16 BRPM | HEART RATE: 79 BPM | TEMPERATURE: 98.1 F | HEIGHT: 62 IN

## 2023-11-13 DIAGNOSIS — Z23 ENCOUNTER FOR ADMINISTRATION OF VACCINE: ICD-10-CM

## 2023-11-13 DIAGNOSIS — Z13.9 ENCOUNTER FOR SCREENING: Primary | ICD-10-CM

## 2023-11-13 DIAGNOSIS — Z3A.01 LESS THAN 8 WEEKS GESTATION OF PREGNANCY: ICD-10-CM

## 2023-11-13 LAB
HCG, PREGNANCY, URINE, POC: POSITIVE
VALID INTERNAL CONTROL, POC: YES

## 2023-11-13 RX ORDER — VITAMIN A ACETATE, BETA CAROTENE, ASCORBIC ACID, CHOLECALCIFEROL, .ALPHA.-TOCOPHEROL ACETATE, DL-, THIAMINE MONONITRATE, RIBOFLAVIN, NIACINAMIDE, PYRIDOXINE HYDROCHLORIDE, FOLIC ACID, CYANOCOBALAMIN, CALCIUM CARBONATE, FERROUS FUMARATE, ZINC OXIDE, CUPRIC OXIDE 3080; 12; 120; 400; 1; 1.84; 3; 20; 22; 920; 25; 200; 27; 10; 2 [IU]/1; UG/1; MG/1; [IU]/1; MG/1; MG/1; MG/1; MG/1; MG/1; [IU]/1; MG/1; MG/1; MG/1; MG/1; MG/1
TABLET, FILM COATED ORAL
Qty: 90 TABLET | Refills: 3 | Status: SHIPPED | OUTPATIENT
Start: 2023-11-13

## 2023-11-13 SDOH — ECONOMIC STABILITY: INCOME INSECURITY: IN THE LAST 12 MONTHS, WAS THERE A TIME WHEN YOU WERE NOT ABLE TO PAY THE MORTGAGE OR RENT ON TIME?: NO

## 2023-11-13 SDOH — ECONOMIC STABILITY: HOUSING INSECURITY
IN THE LAST 12 MONTHS, WAS THERE A TIME WHEN YOU DID NOT HAVE A STEADY PLACE TO SLEEP OR SLEPT IN A SHELTER (INCLUDING NOW)?: NO

## 2023-11-13 SDOH — ECONOMIC STABILITY: FOOD INSECURITY: WITHIN THE PAST 12 MONTHS, YOU WORRIED THAT YOUR FOOD WOULD RUN OUT BEFORE YOU GOT MONEY TO BUY MORE.: NEVER TRUE

## 2023-11-13 SDOH — ECONOMIC STABILITY: FOOD INSECURITY: WITHIN THE PAST 12 MONTHS, THE FOOD YOU BOUGHT JUST DIDN'T LAST AND YOU DIDN'T HAVE MONEY TO GET MORE.: NEVER TRUE

## 2023-11-13 ASSESSMENT — SOCIAL DETERMINANTS OF HEALTH (SDOH)

## 2023-11-13 ASSESSMENT — PATIENT HEALTH QUESTIONNAIRE - PHQ9
SUM OF ALL RESPONSES TO PHQ QUESTIONS 1-9: 2
SUM OF ALL RESPONSES TO PHQ9 QUESTIONS 1 & 2: 2
1. LITTLE INTEREST OR PLEASURE IN DOING THINGS: 1
SUM OF ALL RESPONSES TO PHQ QUESTIONS 1-9: 2
2. FEELING DOWN, DEPRESSED OR HOPELESS: 1

## 2023-11-13 NOTE — PROGRESS NOTES
Assessment/Plan:    Audrey Morton was seen today for results. Diagnoses and all orders for this visit:    Encounter for screening  -     Cancel: AMB POC URINALYSIS DIP STICK MANUAL W/O MICRO  -     AMB POC URINE PREGNANCY TEST, VISUAL COLOR COMPARISON    Encounter for administration of vaccine  -     Influenza, FLUARIX, (age 10 mo+),  IM, Preservative Free, 0.5 mL    Less than 8 weeks gestation of pregnancy  -     Prenatal Vit-Fe Fumarate-FA (PRENATAL PLUS) 27-1 MG TABS; Si a day    Pt would like to establish care at [facility] over for her pregnancy care. The number to the clinic was provided  Flu shot   Start PNV asap     No follow-up provider specified. YULIANA John expressed understanding of this plan. An AVS was printed and given to the patient.      ----------------------------------------------------------------------    Chief Complaint   Patient presents with    Results       History of Present Illness:    Pt returns to discuss results from pelvic ultrasound and labs  She had her LMP 10/4/23, the same day as the ultrasound (which was normal)  She had pos pregnancy test on 23  She has tenderness to her breasts and on/off nausea  She has not started her pNV but will do so soon  She has continued back pain and headache- she understands that it is best to not take any medication at this time but instead try heat, massage, or other natural tx options     No past medical history on file. Current Outpatient Medications   Medication Sig Dispense Refill    Prenatal Vit-Fe Fumarate-FA (PRENATAL PLUS) 27-1 MG TABS Si a day 90 tablet 3     No current facility-administered medications for this visit. No Known Allergies    Social History     Tobacco Use    Smoking status: Never     Passive exposure: Never    Smokeless tobacco: Never   Substance Use Topics    Alcohol use: Never    Drug use: Never       No family history on file.     Physical Exam:     /74 (Site:

## 2023-11-13 NOTE — PROGRESS NOTES
[facility] seen at d/c, full name and  verified. After Visit Summary provided and all discharge instructions reviewed. Medication list reviewed (prenatal). Instructed patient to take a daily prenatal vitamin. Provided Crossover information for patient to establish prenatal care. Influenza vaccine requested by patient. [facility] Immunization Information System (VIIS) historical immunizations reviewed and consent form obtained. VIS given to patient and possible side effects explained, including those which would warrant emergent evaluation. Advised patient to wait 15 minutes after vaccine administration to monitor for adverse reactions, patient verbalizes understanding. Patient denied fever, allergies, and previous immunization reactions. Immunization administered by this RN per order and recorded in EMR and [address]. Opportunity for questions provided, patient denies any questions.  Terri Gibbons RN

## 2023-11-13 NOTE — PROGRESS NOTES
[facility]    When was your last pap smear and where? 10/2023, [facility]    Any abnormal results from previous pap smears? Any problems with your breasts? Yes, \"sometimes I feel pain in both breast\"    Any family history of breast/ovarian cancer? no    Do you have any kids? Yes, 1    Oldest  youngest 10    Are you sexually active? yes    Are you using any type of birth control? If yes where do you go for this? no    Abuse screening completed this visit?  yes     [facility] interpretor #[id number] assisted

## 2023-11-16 ENCOUNTER — TELEPHONE (OUTPATIENT)
Age: 28
End: 2023-11-16

## 2024-01-31 LAB
ABO, EXTERNAL RESULT: NORMAL
HEP B, EXTERNAL RESULT: NEGATIVE
HEPATITIS C ANTIBODY, EXTERNAL RESULT: NONREACTIVE
HIV, EXTERNAL RESULT: NONREACTIVE
RH FACTOR, EXTERNAL RESULT: POSITIVE
RUBELLA TITER, EXTERNAL RESULT: NORMAL

## 2024-02-28 ENCOUNTER — TELEPHONE (OUTPATIENT)
Age: 29
End: 2024-02-28

## 2024-02-28 NOTE — TELEPHONE ENCOUNTER
Appointment rescheduled today per Dr. Roberts. Spoke with the patient using the , [phone]. Notified patient that we will need to reschedule her appointment today. Appointment scheduled for 03/12 at 1:45 PM. Patient to arrive at 1:30 PM. Address to [facility]. Visitor policy reviewed. Patient verbalized understanding.

## 2024-03-12 ENCOUNTER — ROUTINE PRENATAL (OUTPATIENT)
Age: 29
End: 2024-03-12
Payer: MEDICAID

## 2024-03-12 VITALS
WEIGHT: 169.6 LBS | SYSTOLIC BLOOD PRESSURE: 114 MMHG | BODY MASS INDEX: 31.21 KG/M2 | HEART RATE: 90 BPM | DIASTOLIC BLOOD PRESSURE: 77 MMHG

## 2024-03-12 DIAGNOSIS — Z36.3 ENCOUNTER FOR ROUTINE SCREENING FOR FETAL MALFORMATION USING ULTRASOUND: Primary | ICD-10-CM

## 2024-03-12 PROCEDURE — 99203 OFFICE O/P NEW LOW 30 MIN: CPT | Performed by: OBSTETRICS & GYNECOLOGY

## 2024-03-12 NOTE — PROCEDURES
PATIENT: AREVALO REYES,MAGALYSULENY   -  : 1995   -  DOS:2024   -  INTERPRETING PROVIDER:Stephan Lara,   Indication  ========    Anatomy    Method  ======    Transabdominal ultrasound examination. View: Suboptimal view: limited by fetal position    Dating  ======    LMP on: 10/8/2023  Cycle: regular cycle  GA by LMP 22 w + 2 d  MARJORIE by LMP: 2024  Ultrasound examination on: 3/12/2024  GA by U/S based upon: AC, BPD, Femur, HC  GA by U/S 22 w + 2 d  MARJORIE by U/S: 2024  Assigned: based on the LMP, selected on 2024  Assigned GA 22 w + 2 d  Assigned MARJORIE: 2024    Fetal Growth Overview  =================    Exam date        GA              BPD (mm)          HC (mm)              AC (mm)               FL (mm)             HL (mm)            EFW (g)  2024        22w 2d        52.8     37%        195.5    18%        184.9     74%        37.6    30%        35.4     43%        518    58%    Fetal Biometry  ============    Standard  BPD 52.8 mm 22w 0d 37% Hadlock  OFD 69.7 mm 23w 2d 80% Sharon  .5 mm 21w 5d 18% Hadlock  Cerebellum tr 23.7 mm 21w 6d 61% Hill  Nuchal fold 5.2 mm  .9 mm 23w 2d 74% Hadlock  Femur 37.6 mm 22w 0d 30% Hadlock  Humerus 35.4 mm 22w 2d 43% Sharon   g 22w 3d 58% Hadlock  EFW (lb) 1 lb  EFW (oz) 2 oz  EFW by: Hadlock (BPD-HC-AC-FL)  Extended   5.3 mm  CM 6.4 mm  74% Nicolaides  Nasal bone 8.2 mm  Head / Face / Neck  Nasal bone: present  Other Structures   bpm    General Evaluation  ==============    Cardiac activity present.  bpm. Fetal movements: visualized. Presentation: Transverse, head to maternal left  Placenta: Placental site: anterior, appropriate distance from the internal os. Placental edge-to-cervical os distance 6.1 cm  Umbilical cord: Cord vessels: 3 vessel cord. Insertion site: central  Amniotic fluid: Amount of AF: normal. MVP 4.5 cm    Fetal Anatomy  ===========    Cranium: normal  Lateral ventricles: normal  Choroid

## 2024-03-13 ENCOUNTER — TELEMEDICINE (OUTPATIENT)
Age: 29
End: 2024-03-13
Payer: MEDICAID

## 2024-03-13 DIAGNOSIS — Z34.82 ENCOUNTER FOR SUPERVISION OF OTHER NORMAL PREGNANCY, SECOND TRIMESTER: ICD-10-CM

## 2024-03-13 DIAGNOSIS — Z3A.22 22 WEEKS GESTATION OF PREGNANCY: ICD-10-CM

## 2024-03-13 DIAGNOSIS — Z36.0 ENCOUNTER FOR ANTENATAL SCREENING FOR CHROMOSOMAL ANOMALIES: Primary | ICD-10-CM

## 2024-03-13 PROCEDURE — 96040 PR MEDICAL GENETICS COUNSELING EACH 30 MINUTES: CPT | Performed by: COUNSELOR

## 2024-03-13 NOTE — PROGRESS NOTES
time.    Kortney Owens MS, [facility]  Licensed, Certified Genetic Counselor    30 minutes were spent via telemedicine with the patient for genetic evaluation including creating the pedigree, risk assessment, counseling regarding relevant genetic disorders, explanation of appropriate genetic testing options, and arranging genetic testing.     Julia Suleny Arevalo Reyes, was evaluated through a synchronous (real-time) audio-video encounter. The patient (or guardian if applicable) is aware that this is a billable service, which includes applicable co-pays. This Virtual Visit was conducted with patient's (and/or legal guardian's) consent. Patient identification was verified, and a caregiver was present when appropriate.   The patient was located at Home: [address] [place] [address]. [address]  [facility] [id number]  Provider was located at [facility] (Appt Dept[facility]  [address]  [place] [address]

## 2024-04-19 ENCOUNTER — ROUTINE PRENATAL (OUTPATIENT)
Age: 29
End: 2024-04-19
Payer: MEDICAID

## 2024-04-19 VITALS — HEART RATE: 98 BPM | SYSTOLIC BLOOD PRESSURE: 122 MMHG | DIASTOLIC BLOOD PRESSURE: 80 MMHG

## 2024-04-19 DIAGNOSIS — Z3A.27 27 WEEKS GESTATION OF PREGNANCY: Primary | ICD-10-CM

## 2024-04-19 PROCEDURE — 99213 OFFICE O/P EST LOW 20 MIN: CPT | Performed by: OBSTETRICS & GYNECOLOGY

## 2024-04-19 PROCEDURE — 76816 OB US FOLLOW-UP PER FETUS: CPT | Performed by: OBSTETRICS & GYNECOLOGY

## 2024-04-19 NOTE — PROCEDURES
PATIENT: AREVALO REYES,MAGALYSULENY   -  : 1995   -  DOS:2024   -  INTERPRETING PROVIDER:Stephan Lara,   Indication  ========    Follow up, Suboptimal Views    Method  ======    Transabdominal ultrasound examination. View: Sufficient    Pregnancy  =========    Jefferson pregnancy. Number of fetuses: 1    Dating  ======    LMP on: 10/8/2023  Cycle: regular cycle  GA by LMP 27 w + 5 d  MARJORIE by LMP: 2024  Ultrasound examination on: 2024  GA by U/S based upon: AC, BPD, Femur, HC  GA by U/S 28 w + 3 d  MARJORIE by U/S: 2024  Assigned: based on the LMP, selected on 2024  Assigned GA 27 w + 5 d  Assigned MARJORIE: 2024    Fetal Biometry  ============    Standard  BPD 70.8 mm 28w 3d 63% Hadlock  OFD 88.8 mm 28w 4d 76% Sharon  .7 mm 27w 5d 19% Hadlock  Cerebellum tr 34.1 mm 28w 5d 84% Hill  .8 mm 29w 3d 87% Hadlock  Femur 53.2 mm 28w 2d 51% Hadlock  EFW 1,281 g 28w 3d 77% Hadlock  EFW (lb) 2 lb  EFW (oz) 13 oz  EFW by: Hadlock (BPD-HC-AC-FL)  Extended   6.1 mm  Other Structures   bpm    General Evaluation  ==============    Cardiac activity present.  bpm. Fetal movements: visualized. Presentation: Cephalic  Placenta: Placental site: anterior, no previa prior  Umbilical cord: Cord vessels: 3 vessel cord. Insertion site: central  Amniotic fluid: Amount of AF: normal. MVP 4.9 cm. JAIRO 15.6 cm. Q1 4.5 cm, Q2 4.9 cm, Q3 2.8 cm, Q4 3.3 cm    Fetal Anatomy  ===========    Cranium: normal  Cavum septi pellucidi: normal  Head / Neck  Corpus callosum: normal  Lips: normal  4-chamber view: normal  RVOT view: normal  LVOT view: normal  3-vessel view: normal  Heart / Thorax: P. viens (2) noted.  Stomach: normal  Kidneys: normal  Bladder: normal  Lt fingers: normal  Wants to know fetal sex: no    Findings  =======    Viable Jefferson pregnancy at 27w 5d by clinical dates.  EFW is 1281 g at 77%, abdominal circumference at 87%.  Anatomy visualized as stated above.  Amniotic fluid is 
IV pump

## 2024-05-08 LAB
C. TRACHOMATIS, EXTERNAL RESULT: NEGATIVE
N. GONORRHOEAE, EXTERNAL RESULT: NEGATIVE
RPR, EXTERNAL RESULT: NEGATIVE

## 2024-06-19 LAB — GBS, EXTERNAL RESULT: NEGATIVE

## 2024-06-26 ENCOUNTER — ROUTINE PRENATAL (OUTPATIENT)
Age: 29
End: 2024-06-26

## 2024-06-26 VITALS
OXYGEN SATURATION: 97 % | BODY MASS INDEX: 35.66 KG/M2 | RESPIRATION RATE: 18 BRPM | HEART RATE: 98 BPM | WEIGHT: 193.8 LBS | DIASTOLIC BLOOD PRESSURE: 78 MMHG | HEIGHT: 62 IN | TEMPERATURE: 98.1 F | SYSTOLIC BLOOD PRESSURE: 114 MMHG

## 2024-06-26 DIAGNOSIS — Z3A.37 37 WEEKS GESTATION OF PREGNANCY: Primary | ICD-10-CM

## 2024-06-26 PROCEDURE — 0502F SUBSEQUENT PRENATAL CARE: CPT | Performed by: STUDENT IN AN ORGANIZED HEALTH CARE EDUCATION/TRAINING PROGRAM

## 2024-06-26 RX ORDER — FAMOTIDINE 10 MG/1
10 TABLET, FILM COATED ORAL 2 TIMES DAILY
COMMUNITY
Start: 2024-05-29

## 2024-06-26 NOTE — PROGRESS NOTES
Room 6     AMA  Services Utilized for Rooming Protocol  Kyr[place]z Interpretor Number: [id number] Loc  Session: 69689     Identified patient with two patient identifiers (Name and ).     Chief Complaint   Patient presents with    Routine Prenatal Visit     37 Weeks 3 Days  - patient states she was feeling uncomfortable on yesterday, unsure if it is related to how far along she is in her pregnancy. Denies contractions, experiencing some abdominal, but thinks it is related to gastritis. Currently taking an OTC Acid control medication twice a day without relief. Denies vaginal bleeding, spotting, or abnormal vaginal discharge. Confirms active fetal movement.       Vitals:    24 0905   BP: 114/78   Site: Right Upper Arm   Position: Sitting   Cuff Size: Medium Adult   Pulse: 98   Resp: 18   Temp: 98.1 °F (36.7 °C)   TempSrc: Oral   SpO2: 97%   Weight: 87.9 kg (193 lb 12.8 oz)   Height: 1.57 m (5' 1.81\")          \"Have you been to the ER, urgent care clinic since your last visit?  Hospitalized since your last visit?\"    No    “Have you seen or consulted any other health care providers outside of [facility] since your last visit?”    No    This patient is accompanied in the office by her daughter.  I have received verbal consent from Magaly Suleny Arevalo Reyes to discuss any/all medical information while they are present in the room.

## 2024-06-26 NOTE — PROGRESS NOTES
I discussed with the resident the medical history and the resident's findings on physical exam. I discussed with the resident the patient's diagnosis and agree with the plan of care.     29yo  at 37w3d by L/22    IUP: Rh pos  low risk NIPT, Horizon 4 neg  GTT and CBC  okay anatomy okay  s/p Tdap   GBS neg  Transfer of Care: from [facility] at 37w, records to go into media  History of UTI: MALINDA neg    Labor: epidural  Feeding: breast, has a pump   Family Planning: wants Nexplanon   Estimated Date of Delivery: 24

## 2024-06-26 NOTE — PROGRESS NOTES
[id number] [id number]   Office [phone], Fax [phone]  Return OB Visit     Subjective:   Magaly Suleny Arevalo Reyes 28 y.o.   MARJORIE: 2024, by Last Menstrual Period  GA:  37w3d.      Concerns today: none    LOF: none  Vaginal bleeding: none  Fetal movement (after 20 weeks): present  Contractions: none    Pt denies fever, chills, HA, vision disturbances, RUQ pain, chest pain, SOB, N/V/D, constipation, urinary problems, foul smelling vaginal discharge, LE Edema worse than usual.     OB History    Para Term  AB Living   2 1 1 0 0 1   SAB IAB Ectopic Molar Multiple Live Births   0 0 0 0 0 1      # Outcome Date GA Lbr Gilberto/2nd Weight Sex Delivery Anes PTL Lv   2 Current            1 Term 17   2.722 kg (6 lb) F Vag-Spont   SATYA       Allergies- reviewed:   No Known Allergies  Medications- reviewed:   Current Outpatient Medications   Medication Sig    [employer] ACID CONTROLLER 10 MG tablet Take 1 tablet by mouth 2 times daily    Prenatal Vit-Fe Fumarate-FA (PRENATAL PLUS) 27-1 MG TABS Si a day     No current facility-administered medications for this visit.     Objective:   /78 (Site: Right Upper Arm, Position: Sitting, Cuff Size: Medium Adult)   Pulse 98   Temp 98.1 °F (36.7 °C) (Oral)   Resp 18   Ht 1.57 m (5' 1.81\")   Wt 87.9 kg (193 lb 12.8 oz)   LMP 10/08/2023 (Approximate)   SpO2 97%   BMI 35.66 kg/m²     Physical Exam:  GENERAL APPEARANCE: alert, well appearing, in no apparent distress  ABDOMEN: gravid, Fundal height 38 FHT present at 150  bpm  PSYCH: normal mood and affect     Labs  No results found for this or any previous visit (from the past 12 hour(s)).      Assessment        Diagnosis Orders   1. 37 weeks gestation of pregnancy              Plan   28 y.o.  37w3d MARJORIE 2024, by Last Menstrual Period here for return OB visit     1.SIUP at 37w3d by L/22  -PNL: B+/Ab screen neg. Third trim HgB 12.3. HIV/RPR/HepB/HepC NR. GC/CT

## 2024-07-03 ENCOUNTER — ANCILLARY PROCEDURE (OUTPATIENT)
Age: 29
DRG: 560 | End: 2024-07-03
Payer: MEDICAID

## 2024-07-03 ENCOUNTER — ROUTINE PRENATAL (OUTPATIENT)
Age: 29
End: 2024-07-03
Payer: MEDICAID

## 2024-07-03 VITALS
TEMPERATURE: 98.3 F | SYSTOLIC BLOOD PRESSURE: 111 MMHG | BODY MASS INDEX: 36.07 KG/M2 | OXYGEN SATURATION: 96 % | HEART RATE: 86 BPM | HEIGHT: 62 IN | DIASTOLIC BLOOD PRESSURE: 74 MMHG | WEIGHT: 196 LBS

## 2024-07-03 DIAGNOSIS — O09.33 INSUFFICIENT PRENATAL CARE IN THIRD TRIMESTER: ICD-10-CM

## 2024-07-03 DIAGNOSIS — O09.93 SUPERVISION OF HIGH RISK PREGNANCY IN THIRD TRIMESTER: Primary | ICD-10-CM

## 2024-07-03 DIAGNOSIS — O09.93 SUPERVISION OF HIGH RISK PREGNANCY IN THIRD TRIMESTER: ICD-10-CM

## 2024-07-03 DIAGNOSIS — O99.213 OBESITY AFFECTING PREGNANCY IN THIRD TRIMESTER, UNSPECIFIED OBESITY TYPE: ICD-10-CM

## 2024-07-03 PROCEDURE — 76816 OB US FOLLOW-UP PER FETUS: CPT | Performed by: STUDENT IN AN ORGANIZED HEALTH CARE EDUCATION/TRAINING PROGRAM

## 2024-07-03 PROCEDURE — 76805 OB US >/= 14 WKS SNGL FETUS: CPT | Performed by: STUDENT IN AN ORGANIZED HEALTH CARE EDUCATION/TRAINING PROGRAM

## 2024-07-03 ASSESSMENT — PATIENT HEALTH QUESTIONNAIRE - PHQ9
SUM OF ALL RESPONSES TO PHQ QUESTIONS 1-9: 2
SUM OF ALL RESPONSES TO PHQ9 QUESTIONS 1 & 2: 2
2. FEELING DOWN, DEPRESSED OR HOPELESS: SEVERAL DAYS
1. LITTLE INTEREST OR PLEASURE IN DOING THINGS: SEVERAL DAYS
SUM OF ALL RESPONSES TO PHQ QUESTIONS 1-9: 2

## 2024-07-03 NOTE — PROGRESS NOTES
Magaly Suleny Arevalo Reyes is a 28 y.o. female      Chief Complaint   Patient presents with    Routine Prenatal Visit     38w 3d .  No vaginal bleeding but having brownish discharge this morning.  Positive fetal movement.  Hips, pelvic and abdominal pain since last night.        \"Have you been to the ER, urgent care clinic since your last visit?  Hospitalized since your last visit?\"    NO    “Have you seen or consulted any other health care providers outside of [facility] since your last visit?”    NO            Click Here for Release of Records Request    Vitals:    07/03/24 1459   BP: 111/74   Site: Right Upper Arm   Position: Sitting   Cuff Size: Medium Adult   Pulse: 86   Temp: 98.3 °F (36.8 °C)   TempSrc: Oral   SpO2: 96%   Weight: 88.9 kg (196 lb)   Height: 1.57 m (5' 1.81\")           Medication Reconciliation Completed, changes notes. Please Update medication list.

## 2024-07-03 NOTE — PROGRESS NOTES
Hansel Som[facility]  Routine Prenatal Visit        28 y.o.  at 38w3d by /22 here for PAT. Doing well, no complaints. -CTX, +FM, -vaginal bleeding or discharge, -PreE sxs including HA, Vision changes CP/SOB, RUQ pain, or CONCEPICON.      BP Readings from Last 3 Encounters:   24 114/78   24 122/80   24 114/77       Wt Readings from Last 10 Encounters:   24 87.9 kg (193 lb 12.8 oz)   24 76.9 kg (169 lb 9.6 oz)   23 72.8 kg (160 lb 9.6 oz)   10/04/23 73 kg (161 lb)   10/11/22 82.6 kg (182 lb)   22 82.7 kg (182 lb 6.4 oz)   22 82.1 kg (181 lb)         General: NAD, well appearing  Resp: no increased WOB  Cardiac: color good, appears well perfused  Abdomen: no abdominal tenderness or distention  Extremities: no edema    FH: NA  FHT: 137       #PAT:   - Labs reviewed - hgb 13.5; Rh+ and ab screen neg; HIV, syphilis NR, GC/Chlaymydia neg; Hep C neg; Hep B Ag neg, and Hep B serologies not tested; rubella and VZV immune;   early GTT passed; GTT @ 24-28wks passed; TSH not done; Ucx e coli --->neg MALINDA; NIPT and Carrier Screening LR; Hemoglobin evaluation wnl. GBS negative.  - Last Pap 10/2023, NILM  - Sonos reviewed. anatomy sono  wnl. Growth 77/19%ile  - Immunizations: flu utd; Tdap given at crossover; Hep B immunity unknown  - Indications for Aspirin: obesity, SES  - Taking PNV.  - Contraception plan: TBD  - Anticipated mode of delivery: vaginal      # LGA  - EFW 95%ile today.  - IOL scheduled for 39 weeks.    # obesity  - BMI 35  - GTT ok  - on ASA    # UTI - resolved s/p abx.      Today: repeat growth. Scheduled IOL    Next Visit: postpartum        Follow up postpartum.  Next appointment scheduled 7/10/2024    Tate Anthony MD, MPH  [facility]

## 2024-07-06 ENCOUNTER — HOSPITAL ENCOUNTER (INPATIENT)
Facility: HOSPITAL | Age: 29
LOS: 2 days | Discharge: HOME | DRG: 560 | End: 2024-07-08
Attending: OBSTETRICS & GYNECOLOGY | Admitting: FAMILY MEDICINE
Payer: MEDICAID

## 2024-07-06 ENCOUNTER — ANESTHESIA EVENT (OUTPATIENT)
Facility: HOSPITAL | Age: 29
End: 2024-07-06
Payer: MEDICAID

## 2024-07-06 ENCOUNTER — ANESTHESIA (OUTPATIENT)
Facility: HOSPITAL | Age: 29
End: 2024-07-06
Payer: MEDICAID

## 2024-07-06 PROBLEM — Z3A.38 38 WEEKS GESTATION OF PREGNANCY: Status: ACTIVE | Noted: 2024-07-06

## 2024-07-06 LAB
ABO + RH BLD: NORMAL
BLOOD GROUP ANTIBODIES SERPL: NORMAL
ERYTHROCYTE [DISTWIDTH] IN BLOOD BY AUTOMATED COUNT: 13.2 % (ref 11.5–14.5)
HCT VFR BLD AUTO: 36.2 % (ref 35–47)
HGB BLD-MCNC: 12.4 G/DL (ref 11.5–16)
MCH RBC QN AUTO: 30.5 PG (ref 26–34)
MCHC RBC AUTO-ENTMCNC: 34.3 G/DL (ref 30–36.5)
MCV RBC AUTO: 88.9 FL (ref 80–99)
NRBC # BLD: 0 K/UL (ref 0–0.01)
NRBC BLD-RTO: 0 PER 100 WBC
PLATELET # BLD AUTO: 197 K/UL (ref 150–400)
PMV BLD AUTO: 11.6 FL (ref 8.9–12.9)
RBC # BLD AUTO: 4.07 M/UL (ref 3.8–5.2)
RPR SER QL: NONREACTIVE
SPECIMEN EXP DATE BLD: NORMAL
WBC # BLD AUTO: 12.3 K/UL (ref 3.6–11)

## 2024-07-06 PROCEDURE — 86901 BLOOD TYPING SEROLOGIC RH(D): CPT

## 2024-07-06 PROCEDURE — 6360000002 HC RX W HCPCS: Performed by: NURSE ANESTHETIST, CERTIFIED REGISTERED

## 2024-07-06 PROCEDURE — 51702 INSERT TEMP BLADDER CATH: CPT

## 2024-07-06 PROCEDURE — 51701 INSERT BLADDER CATHETER: CPT

## 2024-07-06 PROCEDURE — 2580000003 HC RX 258

## 2024-07-06 PROCEDURE — 7220000101 HC DELIVERY VAGINAL/SINGLE: Performed by: FAMILY MEDICINE

## 2024-07-06 PROCEDURE — 2500000003 HC RX 250 WO HCPCS

## 2024-07-06 PROCEDURE — 99213 OFFICE O/P EST LOW 20 MIN: CPT

## 2024-07-06 PROCEDURE — 1120000000 HC RM PRIVATE OB

## 2024-07-06 PROCEDURE — 85027 COMPLETE CBC AUTOMATED: CPT

## 2024-07-06 PROCEDURE — 36415 COLL VENOUS BLD VENIPUNCTURE: CPT

## 2024-07-06 PROCEDURE — 6360000002 HC RX W HCPCS

## 2024-07-06 PROCEDURE — 6370000000 HC RX 637 (ALT 250 FOR IP)

## 2024-07-06 PROCEDURE — 86592 SYPHILIS TEST NON-TREP QUAL: CPT

## 2024-07-06 PROCEDURE — G0378 HOSPITAL OBSERVATION PER HR: HCPCS

## 2024-07-06 PROCEDURE — G0379 DIRECT REFER HOSPITAL OBSERV: HCPCS

## 2024-07-06 PROCEDURE — 94761 N-INVAS EAR/PLS OXIMETRY MLT: CPT

## 2024-07-06 PROCEDURE — 59409 OBSTETRICAL CARE: CPT | Performed by: FAMILY MEDICINE

## 2024-07-06 PROCEDURE — 3700000025 EPIDURAL BLOCK: Performed by: STUDENT IN AN ORGANIZED HEALTH CARE EDUCATION/TRAINING PROGRAM

## 2024-07-06 PROCEDURE — 86900 BLOOD TYPING SEROLOGIC ABO: CPT

## 2024-07-06 PROCEDURE — 3700000156 HC EPIDURAL ANESTHESIA: Performed by: NURSE ANESTHETIST, CERTIFIED REGISTERED

## 2024-07-06 PROCEDURE — 7210000100 HC LABOR FEE PER 1 HR: Performed by: FAMILY MEDICINE

## 2024-07-06 PROCEDURE — 86850 RBC ANTIBODY SCREEN: CPT

## 2024-07-06 PROCEDURE — 00HU33Z INSERTION OF INFUSION DEVICE INTO SPINAL CANAL, PERCUTANEOUS APPROACH: ICD-10-PCS

## 2024-07-06 PROCEDURE — 2500000003 HC RX 250 WO HCPCS: Performed by: NURSE ANESTHETIST, CERTIFIED REGISTERED

## 2024-07-06 PROCEDURE — 6360000002 HC RX W HCPCS: Performed by: FAMILY MEDICINE

## 2024-07-06 PROCEDURE — 2580000003 HC RX 258: Performed by: FAMILY MEDICINE

## 2024-07-06 RX ORDER — SODIUM CHLORIDE, SODIUM LACTATE, POTASSIUM CHLORIDE, CALCIUM CHLORIDE 600; 310; 30; 20 MG/100ML; MG/100ML; MG/100ML; MG/100ML
INJECTION, SOLUTION INTRAVENOUS CONTINUOUS
Status: DISCONTINUED | OUTPATIENT
Start: 2024-07-06 | End: 2024-07-08 | Stop reason: HOSPADM

## 2024-07-06 RX ORDER — MISOPROSTOL 200 UG/1
400 TABLET ORAL PRN
Status: DISCONTINUED | OUTPATIENT
Start: 2024-07-06 | End: 2024-07-08 | Stop reason: HOSPADM

## 2024-07-06 RX ORDER — ONDANSETRON 2 MG/ML
4 INJECTION INTRAMUSCULAR; INTRAVENOUS EVERY 6 HOURS PRN
Status: DISCONTINUED | OUTPATIENT
Start: 2024-07-06 | End: 2024-07-08 | Stop reason: HOSPADM

## 2024-07-06 RX ORDER — METHYLERGONOVINE MALEATE 0.2 MG/ML
200 INJECTION INTRAVENOUS PRN
Status: DISCONTINUED | OUTPATIENT
Start: 2024-07-06 | End: 2024-07-08 | Stop reason: HOSPADM

## 2024-07-06 RX ORDER — LIDOCAINE HYDROCHLORIDE AND EPINEPHRINE 15; 5 MG/ML; UG/ML
INJECTION, SOLUTION EPIDURAL PRN
Status: DISCONTINUED | OUTPATIENT
Start: 2024-07-06 | End: 2024-07-06 | Stop reason: SDUPTHER

## 2024-07-06 RX ORDER — SODIUM CHLORIDE 0.9 % (FLUSH) 0.9 %
5-40 SYRINGE (ML) INJECTION PRN
Status: DISCONTINUED | OUTPATIENT
Start: 2024-07-06 | End: 2024-07-08 | Stop reason: HOSPADM

## 2024-07-06 RX ORDER — DOCUSATE SODIUM 100 MG/1
100 CAPSULE, LIQUID FILLED ORAL 2 TIMES DAILY
Status: DISCONTINUED | OUTPATIENT
Start: 2024-07-06 | End: 2024-07-08 | Stop reason: HOSPADM

## 2024-07-06 RX ORDER — SODIUM CHLORIDE 9 MG/ML
INJECTION, SOLUTION INTRAVENOUS PRN
Status: DISCONTINUED | OUTPATIENT
Start: 2024-07-06 | End: 2024-07-08 | Stop reason: HOSPADM

## 2024-07-06 RX ORDER — SODIUM CHLORIDE, SODIUM LACTATE, POTASSIUM CHLORIDE, AND CALCIUM CHLORIDE .6; .31; .03; .02 G/100ML; G/100ML; G/100ML; G/100ML
500 INJECTION, SOLUTION INTRAVENOUS PRN
Status: DISCONTINUED | OUTPATIENT
Start: 2024-07-06 | End: 2024-07-08 | Stop reason: HOSPADM

## 2024-07-06 RX ORDER — EPHEDRINE SULFATE/0.9% NACL/PF 25 MG/5 ML
10 SYRINGE (ML) INTRAVENOUS ONCE
Status: DISCONTINUED | OUTPATIENT
Start: 2024-07-06 | End: 2024-07-08 | Stop reason: HOSPADM

## 2024-07-06 RX ORDER — BUPIVACAINE HYDROCHLORIDE 2.5 MG/ML
INJECTION, SOLUTION EPIDURAL; INFILTRATION; INTRACAUDAL PRN
Status: DISCONTINUED | OUTPATIENT
Start: 2024-07-06 | End: 2024-07-06 | Stop reason: SDUPTHER

## 2024-07-06 RX ORDER — DIPHENHYDRAMINE HCL 25 MG
25 CAPSULE ORAL EVERY 4 HOURS PRN
Status: DISCONTINUED | OUTPATIENT
Start: 2024-07-06 | End: 2024-07-08 | Stop reason: HOSPADM

## 2024-07-06 RX ORDER — SODIUM CHLORIDE 0.9 % (FLUSH) 0.9 %
5-40 SYRINGE (ML) INJECTION EVERY 12 HOURS SCHEDULED
Status: DISCONTINUED | OUTPATIENT
Start: 2024-07-06 | End: 2024-07-08 | Stop reason: HOSPADM

## 2024-07-06 RX ORDER — ONDANSETRON 4 MG/1
4 TABLET, ORALLY DISINTEGRATING ORAL EVERY 6 HOURS PRN
Status: DISCONTINUED | OUTPATIENT
Start: 2024-07-06 | End: 2024-07-08 | Stop reason: HOSPADM

## 2024-07-06 RX ORDER — DIPHENHYDRAMINE HYDROCHLORIDE 50 MG/ML
25 INJECTION INTRAMUSCULAR; INTRAVENOUS EVERY 4 HOURS PRN
Status: DISCONTINUED | OUTPATIENT
Start: 2024-07-06 | End: 2024-07-08 | Stop reason: HOSPADM

## 2024-07-06 RX ORDER — ACETAMINOPHEN 500 MG
1000 TABLET ORAL EVERY 8 HOURS SCHEDULED
Status: DISCONTINUED | OUTPATIENT
Start: 2024-07-06 | End: 2024-07-08 | Stop reason: HOSPADM

## 2024-07-06 RX ORDER — ACETAMINOPHEN 325 MG/1
650 TABLET ORAL EVERY 4 HOURS PRN
Status: DISCONTINUED | OUTPATIENT
Start: 2024-07-06 | End: 2024-07-08 | Stop reason: HOSPADM

## 2024-07-06 RX ORDER — IBUPROFEN 800 MG/1
800 TABLET ORAL EVERY 8 HOURS SCHEDULED
Status: DISCONTINUED | OUTPATIENT
Start: 2024-07-06 | End: 2024-07-08 | Stop reason: HOSPADM

## 2024-07-06 RX ORDER — FENTANYL/BUPIVACAINE/NS/PF 2-1250MCG
10 PLASTIC BAG, INJECTION (ML) INJECTION CONTINUOUS
Status: DISCONTINUED | OUTPATIENT
Start: 2024-07-06 | End: 2024-07-08 | Stop reason: HOSPADM

## 2024-07-06 RX ORDER — NALOXONE HYDROCHLORIDE 0.4 MG/ML
INJECTION, SOLUTION INTRAMUSCULAR; INTRAVENOUS; SUBCUTANEOUS PRN
Status: DISCONTINUED | OUTPATIENT
Start: 2024-07-06 | End: 2024-07-08 | Stop reason: HOSPADM

## 2024-07-06 RX ORDER — TRANEXAMIC ACID 10 MG/ML
1000 INJECTION, SOLUTION INTRAVENOUS
Status: COMPLETED | OUTPATIENT
Start: 2024-07-06 | End: 2024-07-06

## 2024-07-06 RX ORDER — LANOLIN/MINERAL OIL
LOTION (ML) TOPICAL PRN
Status: DISCONTINUED | OUTPATIENT
Start: 2024-07-06 | End: 2024-07-08 | Stop reason: HOSPADM

## 2024-07-06 RX ORDER — SODIUM CHLORIDE 9 MG/ML
25 INJECTION, SOLUTION INTRAVENOUS PRN
Status: DISCONTINUED | OUTPATIENT
Start: 2024-07-06 | End: 2024-07-08 | Stop reason: HOSPADM

## 2024-07-06 RX ORDER — TERBUTALINE SULFATE 1 MG/ML
0.25 INJECTION, SOLUTION SUBCUTANEOUS
Status: ACTIVE | OUTPATIENT
Start: 2024-07-06 | End: 2024-07-07

## 2024-07-06 RX ORDER — CARBOPROST TROMETHAMINE 250 UG/ML
250 INJECTION, SOLUTION INTRAMUSCULAR PRN
Status: DISCONTINUED | OUTPATIENT
Start: 2024-07-06 | End: 2024-07-08 | Stop reason: HOSPADM

## 2024-07-06 RX ORDER — FENTANYL CITRATE 50 UG/ML
INJECTION, SOLUTION INTRAMUSCULAR; INTRAVENOUS PRN
Status: DISCONTINUED | OUTPATIENT
Start: 2024-07-06 | End: 2024-07-06 | Stop reason: SDUPTHER

## 2024-07-06 RX ADMIN — METHYLERGONOVINE MALEATE 200 MCG: 0.2 INJECTION, SOLUTION INTRAMUSCULAR; INTRAVENOUS at 13:36

## 2024-07-06 RX ADMIN — IBUPROFEN 800 MG: 800 TABLET, FILM COATED ORAL at 22:59

## 2024-07-06 RX ADMIN — Medication 10 ML/HR: at 11:46

## 2024-07-06 RX ADMIN — BUPIVACAINE HYDROCHLORIDE 5 MG: 2.5 INJECTION, SOLUTION EPIDURAL; INFILTRATION; INTRACAUDAL; PERINEURAL at 11:11

## 2024-07-06 RX ADMIN — SODIUM CHLORIDE, POTASSIUM CHLORIDE, SODIUM LACTATE AND CALCIUM CHLORIDE: 600; 310; 30; 20 INJECTION, SOLUTION INTRAVENOUS at 04:44

## 2024-07-06 RX ADMIN — ACETAMINOPHEN 1000 MG: 500 TABLET ORAL at 16:31

## 2024-07-06 RX ADMIN — LIDOCAINE HYDROCHLORIDE AND EPINEPHRINE 3 ML: 15; 5 INJECTION, SOLUTION EPIDURAL; INFILTRATION; INTRACAUDAL; PERINEURAL at 05:12

## 2024-07-06 RX ADMIN — BUPIVACAINE HYDROCHLORIDE 6 MG: 2.5 INJECTION, SOLUTION EPIDURAL; INFILTRATION; INTRACAUDAL; PERINEURAL at 06:26

## 2024-07-06 RX ADMIN — LIDOCAINE HYDROCHLORIDE AND EPINEPHRINE 3 ML: 15; 5 INJECTION, SOLUTION EPIDURAL; INFILTRATION; INTRACAUDAL; PERINEURAL at 07:15

## 2024-07-06 RX ADMIN — BUPIVACAINE HYDROCHLORIDE 5 MG: 2.5 INJECTION, SOLUTION EPIDURAL; INFILTRATION; INTRACAUDAL; PERINEURAL at 11:14

## 2024-07-06 RX ADMIN — DOCUSATE SODIUM 100 MG: 100 CAPSULE, LIQUID FILLED ORAL at 22:59

## 2024-07-06 RX ADMIN — SODIUM CHLORIDE, POTASSIUM CHLORIDE, SODIUM LACTATE AND CALCIUM CHLORIDE 1000 ML: 600; 310; 30; 20 INJECTION, SOLUTION INTRAVENOUS at 03:43

## 2024-07-06 RX ADMIN — SODIUM CHLORIDE, PRESERVATIVE FREE 10 ML: 5 INJECTION INTRAVENOUS at 21:00

## 2024-07-06 RX ADMIN — Medication 10 ML/HR: at 05:33

## 2024-07-06 RX ADMIN — SODIUM CHLORIDE, POTASSIUM CHLORIDE, SODIUM LACTATE AND CALCIUM CHLORIDE: 600; 310; 30; 20 INJECTION, SOLUTION INTRAVENOUS at 09:06

## 2024-07-06 RX ADMIN — TRANEXAMIC ACID 1000 MG: 10 INJECTION, SOLUTION INTRAVENOUS at 13:39

## 2024-07-06 RX ADMIN — FENTANYL CITRATE 100 MCG: 50 INJECTION, SOLUTION INTRAMUSCULAR; INTRAVENOUS at 06:40

## 2024-07-06 RX ADMIN — IBUPROFEN 800 MG: 800 TABLET, FILM COATED ORAL at 14:51

## 2024-07-06 RX ADMIN — LIDOCAINE HYDROCHLORIDE AND EPINEPHRINE 5 ML: 15; 5 INJECTION, SOLUTION EPIDURAL; INFILTRATION; INTRACAUDAL; PERINEURAL at 05:15

## 2024-07-06 RX ADMIN — OXYTOCIN 2 MILLI-UNITS/MIN: 10 INJECTION, SOLUTION INTRAMUSCULAR; INTRAVENOUS at 09:28

## 2024-07-06 ASSESSMENT — PAIN DESCRIPTION - LOCATION
LOCATION: ABDOMEN;PERINEUM
LOCATION: ABDOMEN
LOCATION: HEAD

## 2024-07-06 ASSESSMENT — PAIN DESCRIPTION - ORIENTATION
ORIENTATION: MID
ORIENTATION: LOWER;MID

## 2024-07-06 ASSESSMENT — PAIN DESCRIPTION - DESCRIPTORS
DESCRIPTORS: ACHING;SORE
DESCRIPTORS: ACHING
DESCRIPTORS: CRAMPING

## 2024-07-06 ASSESSMENT — PAIN SCALES - GENERAL
PAINLEVEL_OUTOF10: 6
PAINLEVEL_OUTOF10: 6

## 2024-07-06 ASSESSMENT — PAIN - FUNCTIONAL ASSESSMENT: PAIN_FUNCTIONAL_ASSESSMENT: ACTIVITIES ARE NOT PREVENTED

## 2024-07-06 NOTE — H&P
[address] [id number]   Office [phone], Fax [phone]      History & Physical    Name: Magaly Suleny Arevalo Reyes MRN: [id number]  SSN: [id number]    YOB: 1995  Age: 28 y.o.  Sex: female      Subjective:     Reason for Admission:  Pregnancy and Contractions    History of Present Illness: Ms. Arevalo Reyes is a 28 y.o.  female with an estimated gestational age of 38w6d with Estimated Date of Delivery: 24. Patient complains of 2 days of contractions with small loss of fluid this morning. Pregnancy has been complicated by the following:  - UTI, E Coli s/p txt   - LGA EFW 95%ile  - Obesity, SES on ASA 81 mg     Denies vaginal bleeding. Endorses adequate fetal movement.    Patient denies fever/chills, HA, dizziness, vision changes, n/v/d, CP, SOB, dysuria, worsening edema.        OB History    Para Term  AB Living   2 1 1 0 0 1   SAB IAB Ectopic Molar Multiple Live Births   0 0 0 0 0 1      # Outcome Date GA Lbr Gilberto/2nd Weight Sex Delivery Anes PTL Lv   2 Current            1 Term 17   2.722 kg (6 lb) F Vag-Spont   SATYA     No past medical history on file.  No past surgical history on file.  Social History     Occupational History    Not on file   Tobacco Use    Smoking status: Never     Passive exposure: Never    Smokeless tobacco: Never   Vaping Use    Vaping Use: Never used   Substance and Sexual Activity    Alcohol use: Never    Drug use: Never    Sexual activity: Not Currently     Birth control/protection: Condom      No family history on file.    No Known Allergies  Prior to Admission medications    Medication Sig Start Date End Date Taking? Authorizing Provider   [employer] ACID CONTROLLER 10 MG tablet Take 1 tablet by mouth 2 times daily 24   Provider, MD Lurdes   Prenatal Vit-Fe Fumarate-FA (PRENATAL PLUS) 27-1 MG TABS Si a day 23   Tara Bowman PA        Review of Systems:  A comprehensive review of

## 2024-07-06 NOTE — ANESTHESIA PROCEDURE NOTES
Epidural Block    Patient location during procedure: OB  Start time: 7/6/2024 7:10 AM  End time: 7/6/2024 7:20 AM  Reason for block: labor epidural  Staffing  Anesthesiologist: Bert Tatum Jr., DO  Resident/CRNA: Jozef Hill APRN - CRNA  Performed by: Jozef Hill APRN - CRNA  Authorized by: Paulo Sigala MD    Epidural  Patient position: sitting  Prep: Betadine  Patient monitoring: continuous pulse ox and frequent blood pressure checks  Approach: midline  Location: L4-5  Injection technique: SHADI air  Provider prep: mask and sterile gloves  Needle  Needle type: Tuohy   Needle gauge: 17 G  Needle length: 3.5 in  Needle insertion depth: 8 cm  Catheter type: multi-orifice  Catheter size: 20 G  Catheter at skin depth: 11 cm  Test dose: negativeCatheter Secured: tegaderm and tape  Assessment  Hemodynamics: stable  Attempts: 1  Outcomes: uncomplicated and patient tolerated procedure well  Additional Notes  Replaced prior epidural due to constant nerve pain down left leg. Catheter removed with tip intact and replaced without difficulty.  Preanesthetic Checklist  Completed: patient identified, IV checked, site marked, risks and benefits discussed, surgical/procedural consents, equipment checked, pre-op evaluation, timeout performed, anesthesia consent given, oxygen available, monitors applied/VS acknowledged, fire risk safety assessment completed and verbalized and blood product R/B/A discussed and consented

## 2024-07-06 NOTE — PROGRESS NOTES
0706: Bedside SBAR report received from MESHA Carey RN. Pt in progress of replacing epidural. This RN receiving care of patient at this time.     0718: Pt assisted to the right tilt position after replacement of epidural. EFM readjusted. Pt tolerated procedure well. Pt reports relief from pain that she felt prior to start of epidural replacement.     0845: Pt eating clear liquid diet.     0900: Dr. Frazier and Dr. Martinez at bedside to evaluate patient and progress of labor. Pt consents to SVE by both MD's. Dr. Frazier discussing with patient plan of care for labor augmentation to include option for AROM or starting of Pitocin at this time. Pt opts for starting of Pitocin at this time. Pt and  deny any additional questions or concerns.     1005: Pt reports pain in LLQ from contractions. Repositioned to left side with peanut ball.     1007: Epidural PCA button pushed.     1021: Pt reports some improvement in LLQ pain after pushing epidural PCA button and position change. Will continue to monitor for improvement of pain.     1055: Pt reports return of pain along left lateral area of abdomen with contractions. Will call anesthesia for epidural redose.     1105: Dr. Chappell at bedside for epidural redose. Pt remains in left lateral position.     1146: Patient actively pushing.  RN, Dr. Frazier, and Dr. Martinez remains in continuous attendance at the bedside.  Assessment & evaluation of fetal heart rate ongoing via continuous EFM.     1156: RN remained at bedside throughout pushing.  EFM continuously assessed.  Vaginal delivery of viable infant.     1327: Dr. Costa called and informed of QBL, increased bleeding noted, clots expelled, pt's bladder emptied, and fundal assessments. TORB for Pitocin to run at 125ml/hr, 1gm TXA, and Methergine IM.     1343: 30u Pitocin/500cc LR started at 125cc/hr.    1545: Pt transferred to MIU via wheelchair. Bedside SBAR report given to AYAZ Mena RN. Bedside safety checks completed.

## 2024-07-06 NOTE — ANESTHESIA PROCEDURE NOTES
Epidural Block    Patient location during procedure: OB  Start time: 7/6/2024 5:10 AM  End time: 7/6/2024 5:12 AM  Reason for block: labor epidural  Staffing  Performed: resident/CRNA   Anesthesiologist: Paulo Sigala MD  Resident/CRNA: Jozef Hill APRN - CRNA  Performed by: Jozef Hill APRN - CRNA  Authorized by: Paulo Sigala MD    Epidural  Patient position: sitting  Prep: ChloraPrep  Patient monitoring: frequent blood pressure checks and continuous pulse ox  Approach: midline  Location: L3-4  Injection technique: SHADI air  Provider prep: mask  Needle  Needle type: Tuohy   Needle gauge: 17 G  Needle length: 3.5 in  Needle insertion depth: 7 cm  Catheter type: multi-orifice  Catheter size: 20 G  Catheter at skin depth: 10 cm  Test dose: negativeCatheter Secured: tegaderm and tape  Assessment  Hemodynamics: stable  Attempts: 1  Outcomes: uncomplicated and patient tolerated procedure well  Preanesthetic Checklist  Completed: patient identified, IV checked, site marked, risks and benefits discussed, surgical/procedural consents, equipment checked, pre-op evaluation, timeout performed, anesthesia consent given, oxygen available, monitors applied/VS acknowledged, fire risk safety assessment completed and verbalized and blood product R/B/A discussed and consented

## 2024-07-06 NOTE — PROGRESS NOTES
[facility] Labor Progress Note     Patient: Magaly Suleny Arevalo Reyes MRN: [id number]  SSN: [id number]    YOB: 1995  Age: 28 y.o.  Sex: female        Subjective:   Patient is not feeling releif yet from her epidural placement.       Objective:   Blood pressure 139/74, pulse 91, temperature 98.2 °F (36.8 °C), temperature source Oral, resp. rate 20, last menstrual period 10/08/2023, SpO2 99 %.      Fetal heart baseline 150,  moderate variability, accelerations  present, decelerations not present, Uterine contractions q 2-3 minutes, strong to palpation, resting tone soft.    Sterile Vaginal Exam: 6 cm dilated/ 90 % effaced/ -1 station, cervix  midline fetal presentation vertex, membranes intact.      Assessment:     38w6d  Category 1 fetal heart rate tracing   Active Labor   GBS:  Negative       Plan:   Continue current orders/management     Hilda, CRNA to be consulted on pt's epidural medication/ bolus due to pt still feeling uncomfortable.     This CNM's management until 8am.   Anticipate SOHAIL Patrick - [facility]

## 2024-07-06 NOTE — PROGRESS NOTES
0257: Assumed care of pt ambulatory from home complaining of contractions Q5-8 minutes starting at 1900. Pt endorses positive fetal mov't. Denies LOF, vaginal bleeding, headache, vision changes, epigastric pain, and/or N/V. EFM/toco applied for continuous monitoring.    0308: AYAZ Blandon CNM, JOCELINE Roberts MD, TATIANA Purcell MD at bedside to conduct assessment and discuss POC.    0316: Pt consents to SVE by AYAZ Blandon CNM. Cervix 4.5/90/-1.    0318: Pt consents to repeat SVE by TATIANA Purcell MD under director AYAZ Blandon CNM.    0440: BHARAT Hill CRNA notified pt IVF bolus for epidural complete.    0451: Pt complaining of urge to push. Consents to SVE. Cervix 6/90/0.    0459: Epidural time out conducted with BHARAT Hill CRNA.    0508: Epidural test dose administered by BHARAT DavisSergio CRNA.    0512: Epidural bolus dose administered by BHARAT DavisSergio CRNA.    0603: Pt complaining of no pain relief or decreased sensation following epidural placement. BHARAT Hill CRNA at bedside.    0615: Epidural bolus dose administered by BHARAT Sergio CRNA.    0623: TREVOR. Sergio CRNA readjusting epidural catheter placement.    0624: Epidural bolus dose administered by D. Sergio CRNA.    0650: TREVOR. Sergio CRNA to replace epidural catheter. At bedside discussing POC.    0703: Epidural time out conducted with BHARAT DavisSergio CRNA.    0706: Bedside and Verbal shift change report given to Patricia RAMON (oncoming nurse) by Daniela RAMON (offgoing nurse). Report included the following information Nurse Handoff Report, Index, Adult Overview, Intake/Output, MAR, Recent Results, and Quality Measures.

## 2024-07-06 NOTE — ANESTHESIA PRE PROCEDURE
Department of Anesthesiology  Preprocedure Note       Name:  Magaly Suleny Arevalo Reyes   Age:  28 y.o.  :  1995                                          MRN:  [id number]         Date:  2024      Surgeon: * No surgeons listed *    Procedure: * No procedures listed *    Medications prior to admission:   Prior to Admission medications    Medication Sig Start Date End Date Taking? Authorizing Provider   [employer] ACID CONTROLLER 10 MG tablet Take 1 tablet by mouth 2 times daily 24   Provider, MD Lurdes   Prenatal Vit-Fe Fumarate-FA (PRENATAL PLUS) 27-1 MG TABS Si a day 23   Tara Bowman PA       Current medications:    Current Facility-Administered Medications   Medication Dose Route Frequency Provider Last Rate Last Admin   • terbutaline (BRETHINE) injection 0.25 mg  0.25 mg SubCUTAneous Once PRN Hernando Roberts MD       • lactated ringers IV soln infusion   IntraVENous Continuous Hernando Roberts  mL/hr at 24 0444 New Bag at 24 0444   • lactated ringers bolus 500 mL  500 mL IntraVENous PRN Hernando Roberts MD        Or   • lactated ringers bolus 500 mL  500 mL IntraVENous PRN Hernando Roberts  mL/hr at 24 0343 1,000 mL at 24 0343   • sodium chloride flush 0.9 % injection 5-40 mL  5-40 mL IntraVENous 2 times per day Hernando Roberts MD       • sodium chloride flush 0.9 % injection 5-40 mL  5-40 mL IntraVENous PRN Hernando Roberts MD       • 0.9 % sodium chloride infusion  25 mL IntraVENous PRN Hernando Roberts MD       • ondansetron (ZOFRAN) injection 4 mg  4 mg IntraVENous Q6H PRN Hernando Roberts MD        Or   • ondansetron (ZOFRAN-ODT) disintegrating tablet 4 mg  4 mg Oral Q6H PRN Hernando Roberts MD       • oxytocin (PITOCIN) 30 units in 500 mL infusion  87.3 jesus-units/min IntraVENous Continuous PRN Hernando Roberts MD        And   • oxytocin (PITOCIN) 10 unit bolus from the bag  10 Units IntraVENous PRN Hernando Roberts MD       • methylergonovine (METHERGINE)

## 2024-07-06 NOTE — ANESTHESIA POSTPROCEDURE EVALUATION
Department of Anesthesiology  Postprocedure Note    Patient: Magaly Suleny Arevalo Reyes  MRN: [id number]  YOB: 1995  Date of evaluation: 7/6/2024    Procedure Summary       Date: 07/06/24 Room / Location:     Anesthesia Start: 0505 Anesthesia Stop: 1156    Procedure: Labor Analgesia Diagnosis:     Scheduled Providers:  Responsible Provider: Paulo Sigala MD    Anesthesia Type: epidural ASA Status: 2            Anesthesia Type: No value filed.    Eze Phase I:      Eze Phase II:      Anesthesia Post Evaluation    Patient location during evaluation: floor  Patient participation: complete - patient participated  Level of consciousness: awake and alert  Pain score: 0  Nausea & Vomiting: no nausea  Cardiovascular status: blood pressure returned to baseline  Respiratory status: acceptable  Hydration status: euvolemic  Pain management: adequate    No notable events documented.

## 2024-07-06 NOTE — PROGRESS NOTES
Labor Progress Note  Patient seen, fetal heart rate and contraction pattern evaluated, patient examined.    Patient Vitals for the past 4 hrs:   Temp Pulse Resp BP SpO2   07/06/24 0531 -- 91 -- 139/74 --   07/06/24 0526 -- 90 -- 135/68 --   07/06/24 0521 -- 93 -- 135/73 --   07/06/24 0515 -- 92 -- 130/68 --   07/06/24 0512 -- 96 -- 124/77 --   07/06/24 0509 -- 93 -- 118/72 --   07/06/24 0507 -- 96 -- 119/78 --   07/06/24 0502 -- 95 -- (!) 137/90 --   07/06/24 0457 -- (!) 101 -- 130/86 --   07/06/24 0307 98.2 °F (36.8 °C) 86 20 119/78 99 %          Physical Exam:  Membranes:  Intact  Uterine Activity: Frequency: Every 3 minutes, Duration: 30 seconds   Fetal Heart Rate: Reactive  Baseline: 145 per minute  Variability: moderate  Accelerations: yes  Decelerations: none  Uterine contractions: regular, every 3 minutes      Assessment/Plan     Magaly Suleny Arevalo Reyes is a 28 y.o. female currently in active labor    Labor:   - GBS neg  - SVE @530 6/90/0  - Epidural placed at 5:10 patient reported no relief after two boluses. Dose of fentanyl also given with no relief. Anesthesia replaced the catheter at 7:00 and patient is now reporting pain relief.     Pt discussed with Dr. Freddie Martinez DO  Family Practice Resident

## 2024-07-06 NOTE — L&D DELIVERY NOTE
Arevalo Reyes, Skye Dumont [[id number]]      Labor Events     Labor: No   Steroids: None  Cervical Ripening Date/Time:      Antibiotics Received during Labor: No  Rupture Date/Time:  24 11:45:00   Rupture Type: SROM, Intact  Fluid Color: Clear  Fluid Odor: None  Fluid Volume: Moderate  Augmentation: Oxytocin  Labor Complications: None              Anesthesia    Method: Epidural       Labor Event Times      Labor onset date/time:  24 19:00:00 EDT     Dilation complete date/time:  24 11:45:00 EDT     Start pushing date/time:     Decision date/time (emergent ):            Labor Length    1st stage: 16h 45m  2nd stage: 0h 11m  3rd stage: 0h 04m       Delivery Details      Delivery Date: 24 Delivery Time: 11:56:00   Delivery Type: Vaginal, Spontaneous              [place] Presentation    Presentation: Vertex       Shoulder Dystocia    Shoulder Dystocia Present?: No       Assisted Delivery Details    Forceps Attempted?: No  Vacuum Extractor Attempted?: No                           Cord    Vessels: 3 Vessels  Complications: None  Delayed Cord Clamping?: Yes  Cord Clamped Date/Time: 2024 11:58:53  Cord Blood Disposition: Discard  Gases Sent?: No              Placenta    Date/Time: 2024 12:00:44  Removal: Spontaneous  Appearance: Intact  Disposition: Discarded       Lacerations    Episiotomy: None  Perineal Lacerations: 1st  Other Lacerations: no non-perineal laceration       Blood Loss  Mother: Arevalo Reyes, Magaly Suleny #[id number]     Start of Mother's Information      Delivery Blood Loss  24 1900 - 24 1425      Quantitative Blood Loss (mL) Hospital Encounter 567 grams    Total  567 mL               End of Mother's Information  Mother: Arevalo Reyes, Magaly Suleny #[id number]                Delivery Providers    Delivering clinician: Stephie Frazier,      Provider Role    Stephie Frazier, DO Obstetrician    Patricia Romero RN Primary Nurse    Rafael  Kortney, RN Primary  Nurse     NICU Nurse     Neonatologist     Anesthesiologist     Nurse Anesthetist     Nurse Practitioner     Midwife     Nursery Nurse    Julio C Martinez DO Resident    Arjun Ortiz, RN Staff Nurse              [place] Assessment    Living Status: Living  Delivery Location Comment: 210        Skin Color:   Heart Rate:   Reflex Irritability:   Muscle Tone:   Respiratory Effort:   Total:            1 Minute:    0    2    2    2    2    8         5 Minute:    1    2    2    2    2    9                                        Apgars Assigned By: CATERINA OVERTON RN              Resuscitation    Method: Bulb Suction, Stimulation              Measurements      Birth Weight: 3705 g   Birth Length: 50.8 cm     Head Circumference: 33 cm     Chest Circumference: 34 cm     Abdominal Girth: 32 cm              Skin to Skin      Skin to Skin Initiation Date/Time: 24 11:56:00 EDT     Skin to Skin With: Mother     Breastfeeding Initiated Date/Time: 2024 12:00:00                  Patient progressed well to C/C/+2 and pushed for approximately 10 min w/  over a first degree perineal laceration with of a liveborn male infant, Apgar scores were 8/9, weight 3705 gm. Head delivered DASHAWN. Anterior (right ) shoulder delivered w/ single maternal effort w/ posterior shoulder and body following easily. Infant placed on maternal abdomen. Delayed cord clamping x 1 min. Cord clamped x 2 and cut by Dad. Pitocin infusion initiated. Placenta delivered spontaneously intact w/ 3 v cord. Perineum inspected. Fundus firm at U. Mother and baby bonding in LDR. Delivery . Methergine and TXA given.

## 2024-07-06 NOTE — PROGRESS NOTES
Labor Progress Note  Patient seen, fetal heart rate and contraction pattern evaluated, patient examined.    Patient Vitals for the past 4 hrs:   Temp Pulse Resp BP SpO2   07/06/24 0845 98 °F (36.7 °C) 93 16 99/60 99 %   07/06/24 0843 -- -- -- -- 99 %   07/06/24 0748 -- -- -- -- 98 %   07/06/24 0744 -- 90 16 (!) 105/55 98 %   07/06/24 0728 -- 92 16 117/66 97 %   07/06/24 0721 -- 93 16 111/65 97 %   07/06/24 0718 -- -- -- -- 98 %   07/06/24 0703 -- 99 16 119/74 97 %   07/06/24 0633 -- -- -- -- 99 %   07/06/24 0632 -- 92 -- 114/74 --          Physical Exam:  Cervical Exam:   5/70/-2 @ 9:00  Membranes:  Intact  Uterine Activity: Frequency: Every 5 minutes and Intensity: moderate  Fetal Heart Rate: Reactive  Baseline: 145 per minute  Variability: moderate  Accelerations: yes  Decelerations: none      Assessment/Plan     Magaly Suleny Arevalo Reyes is a 28 y.o. female currently in latent labor    Labor:   - GBS negative  - SVE now 5/70/-2  - Epidural is placed. Patient is feeling much better pain relief with the placement of the 2nd catheter.   - Pitocin will be started, order placed.   - Patient will be monitored and rechecked.       Pt seen with Dr. Freddie Martinez DO  Family Practice Resident

## 2024-07-07 PROCEDURE — 6370000000 HC RX 637 (ALT 250 FOR IP)

## 2024-07-07 PROCEDURE — 1120000000 HC RM PRIVATE OB

## 2024-07-07 RX ADMIN — IBUPROFEN 800 MG: 800 TABLET, FILM COATED ORAL at 18:06

## 2024-07-07 RX ADMIN — ACETAMINOPHEN 1000 MG: 500 TABLET ORAL at 12:56

## 2024-07-07 RX ADMIN — ACETAMINOPHEN 1000 MG: 500 TABLET ORAL at 02:48

## 2024-07-07 RX ADMIN — DOCUSATE SODIUM 100 MG: 100 CAPSULE, LIQUID FILLED ORAL at 09:27

## 2024-07-07 RX ADMIN — ACETAMINOPHEN 1000 MG: 500 TABLET ORAL at 20:50

## 2024-07-07 RX ADMIN — IBUPROFEN 800 MG: 800 TABLET, FILM COATED ORAL at 09:27

## 2024-07-07 ASSESSMENT — PAIN DESCRIPTION - LOCATION
LOCATION: ABDOMEN
LOCATION: PERINEUM
LOCATION: ABDOMEN

## 2024-07-07 ASSESSMENT — PAIN SCALES - GENERAL
PAINLEVEL_OUTOF10: 3
PAINLEVEL_OUTOF10: 5
PAINLEVEL_OUTOF10: 5
PAINLEVEL_OUTOF10: 6
PAINLEVEL_OUTOF10: 1
PAINLEVEL_OUTOF10: 3

## 2024-07-07 ASSESSMENT — PAIN - FUNCTIONAL ASSESSMENT
PAIN_FUNCTIONAL_ASSESSMENT: ACTIVITIES ARE NOT PREVENTED
PAIN_FUNCTIONAL_ASSESSMENT: ACTIVITIES ARE NOT PREVENTED

## 2024-07-07 ASSESSMENT — PAIN DESCRIPTION - ORIENTATION: ORIENTATION: LOWER;MID

## 2024-07-07 ASSESSMENT — PAIN DESCRIPTION - DESCRIPTORS
DESCRIPTORS: SORE
DESCRIPTORS: CRAMPING

## 2024-07-07 NOTE — PROGRESS NOTES
[id number] [id number]   Office [phone], Fax [phone]                                Post-Partum Day Number 1 Progress Note    Patient doing well post-partum without significant complaint. Tolerating diet.  Ambulating. Complaining of 5-8/10 abdominal and back pain. Denies excessive bleeding.         Vitals:  Patient Vitals for the past 8 hrs:   BP Temp Temp src Pulse Resp SpO2   24 0619 103/61 97.9 °F (36.6 °C) Oral 78 16 99 %   24 0349 102/63 97.9 °F (36.6 °C) Oral 79 16 98 %     Temp (24hrs), Av °F (36.7 °C), Min:97.9 °F (36.6 °C), Max:98.2 °F (36.8 °C)      Exam:  Patient without distress.               CTAB, no w/r/r/c.               RRR, +S1 and S2, no m/r/g.    Abdomen soft with point tenderness on LUQ               Perineum with normal lochia noted.               Lower extremities:  No edema. No palpable cords or tenderness.    Lab/Data Review:  No results found for this or any previous visit (from the past 12 hour(s)).      Assessment and Plan:    Magaly Suleny Arevalo Reyes is a 28 y.o. s/p  at  at 38w6d. Patient appears to be having uncomplicated post-partum course.      Continue routine perineal care and maternal education.    Plan discharge tomorrow if no problems occur.  Give due Motrin and will reassess pain thereafter.     Patient discussed with Dr. Frazier.                 Gabriella Marshall MD  Family Medicine Resident

## 2024-07-08 ENCOUNTER — LACTATION ENCOUNTER (OUTPATIENT)
Facility: HOSPITAL | Age: 29
End: 2024-07-08

## 2024-07-08 VITALS
RESPIRATION RATE: 16 BRPM | TEMPERATURE: 97.5 F | DIASTOLIC BLOOD PRESSURE: 85 MMHG | HEART RATE: 73 BPM | SYSTOLIC BLOOD PRESSURE: 115 MMHG | OXYGEN SATURATION: 100 %

## 2024-07-08 PROBLEM — Z3A.38 38 WEEKS GESTATION OF PREGNANCY: Status: RESOLVED | Noted: 2024-07-06 | Resolved: 2024-07-08

## 2024-07-08 PROCEDURE — 6370000000 HC RX 637 (ALT 250 FOR IP)

## 2024-07-08 PROCEDURE — 94761 N-INVAS EAR/PLS OXIMETRY MLT: CPT

## 2024-07-08 RX ORDER — IBUPROFEN 800 MG/1
800 TABLET ORAL EVERY 8 HOURS PRN
Qty: 30 TABLET | Refills: 0 | Status: SHIPPED | OUTPATIENT
Start: 2024-07-08 | End: 2024-08-07

## 2024-07-08 RX ORDER — DOCUSATE SODIUM 100 MG/1
100 CAPSULE, LIQUID FILLED ORAL DAILY PRN
Qty: 20 CAPSULE | Refills: 0 | Status: SHIPPED | OUTPATIENT
Start: 2024-07-08 | End: 2024-08-07

## 2024-07-08 RX ADMIN — ACETAMINOPHEN 1000 MG: 500 TABLET ORAL at 05:24

## 2024-07-08 RX ADMIN — IBUPROFEN 800 MG: 800 TABLET, FILM COATED ORAL at 02:25

## 2024-07-08 RX ADMIN — IBUPROFEN 800 MG: 800 TABLET, FILM COATED ORAL at 12:27

## 2024-07-08 RX ADMIN — ACETAMINOPHEN 1000 MG: 500 TABLET ORAL at 12:27

## 2024-07-08 ASSESSMENT — PAIN DESCRIPTION - LOCATION: LOCATION: ABDOMEN

## 2024-07-08 ASSESSMENT — PAIN DESCRIPTION - DESCRIPTORS: DESCRIPTORS: CRAMPING

## 2024-07-08 ASSESSMENT — PAIN SCALES - GENERAL
PAINLEVEL_OUTOF10: 2
PAINLEVEL_OUTOF10: 2
PAINLEVEL_OUTOF10: 3

## 2024-07-08 NOTE — LACTATION NOTE
This note was copied from a baby's chart.  Mother ambulatory in room preparing for discharge home.  Mother states BF is going well.  Mother has pump at home.  BF basics reviewed and discharge instructions. Wanda, inpatient interpretor used for consult.      Discussed with mother her plan for feeding.  Reviewed the benefits of exclusive breast milk feeding during the hospital stay.   She acknowledges understanding of information reviewed and states that it is her plan to breastfeed her infant.  Will support her choice and offer additional information as needed.     Reviewed breastfeeding basics:  How milk is made and normal  breastfeeding behaviors discussed.  Supply and demand,  stomach size, early feeding cues, skin to skin bonding with comfortable positioning and baby led latch-on reviewed.  How to identify signs of successful breastfeeding sessions reviewed; education on normal  feeding frequency and duration and expected infant output discussed.  Normal course of breastfeeding discussed including the AAP's recommendation that children receive exclusive breast milk feedings for the first six months of life with breast milk feedings to continue through the first year of life and/or beyond as complimentary table foods are added.  Breastfeeding Booklet and Warm line information provided with discussion.  Discussed typical  weight loss and the importance of pediatrician appointment within 24-48 hours of discharge, at 2 weeks of life and normalcy of requesting pediatric weight checks as needed in between visits.      Chart shows numerous feedings, void, stool WNL.  Discussed importance of monitoring outputs and feedings on first week of life.  Discussed ways to tell if baby is  getting enough breast milk, ie  voids and stools, change in color of stool, and return to birth wt within 2 weeks.  Follow up with pediatrician visit for weight check in 1-2 days (per AAP guidelines.)  Encouraged  Azithromycin Pregnancy And Lactation Text: This medication is considered safe during pregnancy and is also secreted in breast milk. Dapsone Pregnancy And Lactation Text: This medication is Pregnancy Category C and is not considered safe during pregnancy or breast feeding. Bactrim Pregnancy And Lactation Text: This medication is Pregnancy Category D and is known to cause fetal risk.  It is also excreted in breast milk. Detail Level: Zone Spironolactone Counseling: Patient advised regarding risks of diarrhea, abdominal pain, hyperkalemia, birth defects (for female patients), liver toxicity and renal toxicity. The patient may need blood work to monitor liver and kidney function and potassium levels while on therapy. The patient verbalized understanding of the proper use and possible adverse effects of spironolactone.  All of the patient's questions and concerns were addressed. Topical Sulfur Applications Counseling: Topical Sulfur Counseling: Patient counseled that this medication may cause skin irritation or allergic reactions.  In the event of skin irritation, the patient was advised to reduce the amount of the drug applied or use it less frequently.   The patient verbalized understanding of the proper use and possible adverse effects of topical sulfur application.  All of the patient's questions and concerns were addressed. Birth Control Pills Pregnancy And Lactation Text: This medication should be avoided if pregnant and for the first 30 days post-partum. Topical Retinoid counseling:  Patient advised to apply a pea-sized amount only at bedtime and wait 30 minutes after washing their face before applying.  If too drying, patient may add a non-comedogenic moisturizer. The patient verbalized understanding of the proper use and possible adverse effects of retinoids.  All of the patient's questions and concerns were addressed. Erythromycin Pregnancy And Lactation Text: This medication is Pregnancy Category B and is considered safe during pregnancy. It is also excreted in breast milk. Tetracycline Pregnancy And Lactation Text: This medication is Pregnancy Category D and not consider safe during pregnancy. It is also excreted in breast milk. Spironolactone Pregnancy And Lactation Text: This medication can cause feminization of the male fetus and should be avoided during pregnancy. The active metabolite is also found in breast milk. Bactrim Counseling:  I discussed with the patient the risks of sulfa antibiotics including but not limited to GI upset, allergic reaction, drug rash, diarrhea, dizziness, photosensitivity, and yeast infections.  Rarely, more serious reactions can occur including but not limited to aplastic anemia, agranulocytosis, methemoglobinemia, blood dyscrasias, liver or kidney failure, lung infiltrates or desquamative/blistering drug rashes. Doxycycline Pregnancy And Lactation Text: This medication is Pregnancy Category D and not consider safe during pregnancy. It is also excreted in breast milk but is considered safe for shorter treatment courses. Isotretinoin Pregnancy And Lactation Text: This medication is Pregnancy Category X and is considered extremely dangerous during pregnancy. It is unknown if it is excreted in breast milk. Dapsone Counseling: I discussed with the patient the risks of dapsone including but not limited to hemolytic anemia, agranulocytosis, rashes, methemoglobinemia, kidney failure, peripheral neuropathy, headaches, GI upset, and liver toxicity.  Patients who start dapsone require monitoring including baseline LFTs and weekly CBCs for the first month, then every month thereafter.  The patient verbalized understanding of the proper use and possible adverse effects of dapsone.  All of the patient's questions and concerns were addressed. Birth Control Pills Counseling: Birth Control Pill Counseling: I discussed with the patient the potential side effects of OCPs including but not limited to increased risk of stroke, heart attack, thrombophlebitis, deep venous thrombosis, hepatic adenomas, breast changes, GI upset, headaches, and depression.  The patient verbalized understanding of the proper use and possible adverse effects of OCPs. All of the patient's questions and concerns were addressed. Benzoyl Peroxide Pregnancy And Lactation Text: This medication is Pregnancy Category C. It is unknown if benzoyl peroxide is excreted in breast milk. Topical Sulfur Applications Pregnancy And Lactation Text: This medication is Pregnancy Category C and has an unknown safety profile during pregnancy. It is unknown if this topical medication is excreted in breast milk. Tazorac Counseling:  Patient advised that medication is irritating and drying.  Patient may need to apply sparingly and wash off after an hour before eventually leaving it on overnight.  The patient verbalized understanding of the proper use and possible adverse effects of tazorac.  All of the patient's questions and concerns were addressed. Tazorac Pregnancy And Lactation Text: This medication is not safe during pregnancy. It is unknown if this medication is excreted in breast milk. Minocycline Counseling: Patient advised regarding possible photosensitivity and discoloration of the teeth, skin, lips, tongue and gums.  Patient instructed to avoid sunlight, if possible.  When exposed to sunlight, patients should wear protective clothing, sunglasses, and sunscreen.  The patient was instructed to call the office immediately if the following severe adverse effects occur:  hearing changes, easy bruising/bleeding, severe headache, or vision changes.  The patient verbalized understanding of the proper use and possible adverse effects of minocycline.  All of the patient's questions and concerns were addressed. Erythromycin Counseling:  I discussed with the patient the risks of erythromycin including but not limited to GI upset, allergic reaction, drug rash, diarrhea, increase in liver enzymes, and yeast infections. Topical Clindamycin Pregnancy And Lactation Text: This medication is Pregnancy Category B and is considered safe during pregnancy. It is unknown if it is excreted in breast milk. Topical Retinoid Pregnancy And Lactation Text: This medication is Pregnancy Category C. It is unknown if this medication is excreted in breast milk. Doxycycline Counseling:  Patient counseled regarding possible photosensitivity and increased risk for sunburn.  Patient instructed to avoid sunlight, if possible.  When exposed to sunlight, patients should wear protective clothing, sunglasses, and sunscreen.  The patient was instructed to call the office immediately if the following severe adverse effects occur:  hearing changes, easy bruising/bleeding, severe headache, or vision changes.  The patient verbalized understanding of the proper use and possible adverse effects of doxycycline.  All of the patient's questions and concerns were addressed. Benzoyl Peroxide Counseling: Patient counseled that medicine may cause skin irritation and bleach clothing.  In the event of skin irritation, the patient was advised to reduce the amount of the drug applied or use it less frequently.   The patient verbalized understanding of the proper use and possible adverse effects of benzoyl peroxide.  All of the patient's questions and concerns were addressed. Isotretinoin Counseling: Patient should get monthly blood tests, not donate blood, not drive at night if vision affected, not share medication, and not undergo elective surgery for 6 months after tx completed. Side effects reviewed, pt to contact office should one occur. Use Enhanced Medication Counseling?: No Azithromycin Counseling:  I discussed with the patient the risks of azithromycin including but not limited to GI upset, allergic reaction, drug rash, diarrhea, and yeast infections. High Dose Vitamin A Counseling: Side effects reviewed, pt to contact office should one occur. Tetracycline Counseling: Patient counseled regarding possible photosensitivity and increased risk for sunburn.  Patient instructed to avoid sunlight, if possible.  When exposed to sunlight, patients should wear protective clothing, sunglasses, and sunscreen.  The patient was instructed to call the office immediately if the following severe adverse effects occur:  hearing changes, easy bruising/bleeding, severe headache, or vision changes.  The patient verbalized understanding of the proper use and possible adverse effects of tetracycline.  All of the patient's questions and concerns were addressed. Patient understands to avoid pregnancy while on therapy due to potential birth defects. High Dose Vitamin A Pregnancy And Lactation Text: High dose vitamin A therapy is contraindicated during pregnancy and breast feeding. Topical Clindamycin Counseling: Patient counseled that this medication may cause skin irritation or allergic reactions.  In the event of skin irritation, the patient was advised to reduce the amount of the drug applied or use it less frequently.   The patient verbalized understanding of the proper use and possible adverse effects of clindamycin.  All of the patient's questions and concerns were addressed. Detail Level: Detailed

## 2024-07-08 NOTE — DISCHARGE INSTRUCTIONS
Patient Discharge Instructions    Magaly Suleny Arevalo Reyes / [id number] : 1995    Admitted 2024 Discharged: 2024       Por favor tenga brooke documento presente en sellers avelino de seguimiento con sellers médico primario.    Primary care provider:  [facility]    Discharging provider:  Dr. Tate Anthony MD  - Family Medicine Attending           Diágnostico de Admisión:  Uterine contractions [O47.9]    [unfilled]  RECOMENDACIONES DE CUIDADO:     [unfilled]    Continue Tratamiento:  - Por favor continue Motrin 800 mg, 1 tableta cada 8 horas por los próximos 2 días, luego 1 tableta cada 8 horas mientras sea necesario para el dolor.  - Por favor continue Colace 100 mg para el estreñimiento, tome 1 tableta dos veces al día hasta que pueda defercar regularmente sin necesidad del medicamento.  - Por favor continue tomando santi vitaminas prenatales.     Importante que monitoreé los siguientes síntomas: dolor de pecho, falta de aire, fiebre, escalofríos, náusea, vómito, diarrea, cambios en sellers estado mental, caídas, debilidad y sangrado.      DIETA/que comer:  Regular    ACTIVIDAD FÍSICA:   Instrucciones de Actividad Física    No levante nada que sea más pesado que sellers bebé por las próximas 6 semanas.  No insertar nada por la vaginal por 6 semanas. Luego del parto por cesárea/ vaginal debe evitar tener relaciones sexuales por 6 semanas.Tendrá sellers avelino de seguimiento posparto a las 6 semanas. Puede manejar sellers vehículo mientras no esté tomando Percocet ni ningún medicamento nárcotico (Motrin está daljit).       Cuidado de Herida:  llene el sandy bottle con agua tibia y exprima hasta que salga un chorro de agua por la boquilla para ayudarle a limpiar el área perineal.      Entiendo que si surge algún problema cuando llegue a mi hogar puedo contactar a mi médico.    Me muse explicado las siguientes instrucciones y muse aclarado mis dudas.    Entiendo y reconozco las instrucciones brindadas.

## 2024-07-08 NOTE — DISCHARGE SUMMARY
Obstetrical Discharge Summary     Name: Magaly Suleny Arevalo Reyes MRN: [id number]  SSN: [id number]    YOB: 1995  Age: 28 y.o.  Sex: female      Admit Date: 2024    Discharge Date: 2024     Admitting Physician: Stephie Frazier DO     Attending Physician:  Stephie Frazier DO     Admission Diagnoses: Uterine contractions [O47.9]    Discharge Diagnoses: Liveborn birth of infant  Information for the patient's :  Arevalo Reyes, Skye Dumont [[id number]]   @[phone]@      Additional Diagnoses:     Immunization(s):   Immunization History   Administered Date(s) Administered    Influenza, FLUARIX, FLULAVAL, FLUZONE (age 6 mo+) AND AFLURIA, (age 3 y+), PF, 0.5mL 10/11/2022, 2023        Delivery Summary:  Patient progressed well to C/C/+2 and pushed for approximately 10 min w/  over a first degree perineal laceration with of a liveborn male infant, Apgar scores were 8/9, weight 3705 gm. Head delivered DASHAWN. Anterior (right ) shoulder delivered w/ single maternal effort w/ posterior shoulder and body following easily. Infant placed on maternal abdomen. Delayed cord clamping x 1 min. Cord clamped x 2 and cut by Dad. Pitocin infusion initiated. Placenta delivered spontaneously intact w/ 3 v cord. Perineum inspected. Fundus firm at U. Mother and baby bonding in LDR. Delivery . Methergine and TXA given.     Hospital Course:   Patient is a 28 y.o.  s/p  at 38 weeks 6 days.  Presented with 2 days of contractions and small loss of fluid.  Pregnancy complicated by UTI (E.Coli s/p tx ), LGA EFW 95%ile,Obesity (on ASA). Labor was uncomplicated.  Delivered TLMI by  without complications.  Normal hospital course following the delivery.  On day of discharge patient reported minimal lochia, well controlled pain, and no other complaints.  Discharged with pain regimen and bowel regimen.  Advised to continue prenatal vitamins.  Follow up with [facility]

## 2024-07-08 NOTE — CARE COORDINATION
7/8/2024  12:26 PM    CM met with AMARA to complete initial assessment and begin discharge planning.  MOB verified and confirmed demographics.  AMARA lives with FOB, at the address on file. AMARA reports she has good family support, and feels like she has the support she needs when she returns home.  AMARA plans to breast feed baby and has pump to use at home.  [facility] will provide follow up care for infant. AMARA has car seat, bassinet/crib, clothing, bottles and all necessary supplies for baby. AMARA has Medicaid, and will be adding baby to this policy. CM discussed process to add baby to insurance, AMARA verbalized understanding.       07/08/24 9358   Service Assessment   Patient Orientation Alert and Oriented   Cognition Alert   History Provided By Patient   Primary Caregiver Self   Support Systems Spouse/Significant Other   PCP Verified by CM Yes   Last Visit to PCP Within last 3 months   Prior Functional Level Independent in ADLs/IADLs   Current Functional Level Independent in ADLs/IADLs   Can patient return to prior living arrangement Yes   Ability to make needs known: Good   Family able to assist with home care needs: Yes   Would you like for me to discuss the discharge plan with any other family members/significant others, and if so, who? No   Financial Resources Medicaid     Angelo Chávez CM

## 2024-07-09 ENCOUNTER — TELEPHONE (OUTPATIENT)
Age: 29
End: 2024-07-09

## 2024-07-09 NOTE — TELEPHONE ENCOUNTER
I called the patient to let her know that we received her Nexplanon implant in clinic today. I went ahead and scheduled her a procedure appointment to get it inserted. Patient is aware about date and time of appointment.

## 2024-08-19 ENCOUNTER — OFFICE VISIT (OUTPATIENT)
Age: 29
End: 2024-08-19
Payer: MEDICAID

## 2024-08-19 VITALS
OXYGEN SATURATION: 98 % | WEIGHT: 175.8 LBS | BODY MASS INDEX: 32.35 KG/M2 | SYSTOLIC BLOOD PRESSURE: 106 MMHG | DIASTOLIC BLOOD PRESSURE: 71 MMHG | RESPIRATION RATE: 18 BRPM | HEIGHT: 62 IN | TEMPERATURE: 97.1 F | HEART RATE: 67 BPM

## 2024-08-19 DIAGNOSIS — K59.00 CONSTIPATION, UNSPECIFIED CONSTIPATION TYPE: Primary | ICD-10-CM

## 2024-08-19 DIAGNOSIS — R30.0 DYSURIA: ICD-10-CM

## 2024-08-19 LAB
BILIRUBIN, URINE, POC: NEGATIVE
BLOOD URINE, POC: NEGATIVE
GLUCOSE URINE, POC: NEGATIVE
KETONES, URINE, POC: NEGATIVE
LEUKOCYTE ESTERASE, URINE, POC: NEGATIVE
NITRITE, URINE, POC: NEGATIVE
PH, URINE, POC: 6 (ref 4.6–8)
PROTEIN,URINE, POC: NEGATIVE
SPECIFIC GRAVITY, URINE, POC: 1.02 (ref 1–1.03)
URINALYSIS CLARITY, POC: NORMAL
URINALYSIS COLOR, POC: YELLOW
UROBILINOGEN, POC: NORMAL

## 2024-08-19 PROCEDURE — PBSHW AMB POC URINALYSIS DIP STICK AUTO W/O MICRO

## 2024-08-19 PROCEDURE — 0503F POSTPARTUM CARE VISIT: CPT

## 2024-08-19 PROCEDURE — 81003 URINALYSIS AUTO W/O SCOPE: CPT

## 2024-08-19 RX ORDER — POLYETHYLENE GLYCOL 3350 17 G/17G
17 POWDER, FOR SOLUTION ORAL DAILY PRN
Qty: 510 G | Refills: 0 | Status: SHIPPED | OUTPATIENT
Start: 2024-08-19 | End: 2024-09-18

## 2024-08-19 RX ORDER — NITROFURANTOIN 25; 75 MG/1; MG/1
100 CAPSULE ORAL 2 TIMES DAILY
Qty: 10 CAPSULE | Refills: 0 | Status: SHIPPED | OUTPATIENT
Start: 2024-08-19 | End: 2024-08-24

## 2024-08-19 NOTE — PROGRESS NOTES
[facility]     [address] [place] [address]   Office [phone], Fax [phone]    Subjective:     Post partum     History provided by patient       6 Week Post Partum Note    28 y.o. female  s/p  at 38 weeks 6 days. Pregnancy complicated by UTI (E.Coli s/p tx ), LGA EFW 95%ile,Obesity (on ASA). Labor was uncomplicated.  Delivered TLMI by  without complications.  Normal hospital course following the delivery.  On day of discharge patient reported minimal lochia, well controlled pain, and no other complaints.  Discharged with pain regimen and bowel regimen.  Advised to continue prenatal vitamins.  Follow up with [facility] in 6 weeks.     Lochia: normal  Pain: controlled overall - having some intermittent lower back pain.   Baby: doing well, has seen PCP  Sexual activity: no   Plan for contraception: Nexplanon - appt on  with Dr. Anthony   Breast/bottle: breast feeding   Support from FOB/family: good  EDS: 1      Objective:     Vitals:    24 1307   BP: 106/71   Pulse: 67   Resp: 18   Temp: 97.1 °F (36.2 °C)   SpO2: 98%           Exam:  Patient without distress.    Abdomen soft, fundus firm at level of umbilicus, nontender.   Lower extremities:  No edema. No palpable cords or tenderness.    Pertinent Labs/Studies:   POC UA:   Nml UA.     Assessment and orders:     Assessment and Plan:    Magaly Suleny Arevalo Reyes is a 28 y.o.  s/p  at 38 weeks 6 days.  Patient having uncomplicated post-partum course.      Dysuria: with suprapubic pain. UA non-infectious. Will treat and send urine for culture. If symptoms do not improve, will need vaginal exam with speculum.Called and d/w patient.   Constipation: Hard stools. Dicussed supportive care. Sending in Mirlax as well.   Continue routine care  Call clinic or make appointment for symptoms of sadness  Follow up for yearly well woman

## 2024-08-19 NOTE — PROGRESS NOTES
Chief Complaint   Patient presents with    Postpartum Care     Patient states she is feeling good.      Vitals:    08/19/24 1307   BP: 106/71   Site: Right Upper Arm   Position: Sitting   Cuff Size: Large Adult   Pulse: 67   Resp: 18   Temp: 97.1 °F (36.2 °C)   TempSrc: Temporal   SpO2: 98%   Weight: 79.7 kg (175 lb 12.8 oz)   Height: 1.57 m (5' 1.81\")     \"Have you been to the ER, urgent care clinic since your last visit?  Hospitalized since your last visit?\"    NO    “Have you seen or consulted any other health care providers outside of [facility] since your last visit?”    NO            Click Here for Release of Records Request

## 2024-08-20 LAB
BACTERIA SPEC CULT: NORMAL
SERVICE CMNT-IMP: NORMAL

## 2024-08-23 ENCOUNTER — PROCEDURE VISIT (OUTPATIENT)
Age: 29
End: 2024-08-23
Payer: MEDICAID

## 2024-08-23 VITALS
RESPIRATION RATE: 18 BRPM | BODY MASS INDEX: 32.39 KG/M2 | OXYGEN SATURATION: 98 % | HEIGHT: 62 IN | WEIGHT: 176 LBS | SYSTOLIC BLOOD PRESSURE: 113 MMHG | TEMPERATURE: 97.8 F | DIASTOLIC BLOOD PRESSURE: 77 MMHG | HEART RATE: 87 BPM

## 2024-08-23 DIAGNOSIS — Z30.017 ENCOUNTER FOR INITIAL PRESCRIPTION OF NEXPLANON: Primary | ICD-10-CM

## 2024-08-23 LAB
HCG, PREGNANCY, URINE, POC: NEGATIVE
VALID INTERNAL CONTROL, POC: NORMAL

## 2024-08-23 PROCEDURE — 81025 URINE PREGNANCY TEST: CPT | Performed by: STUDENT IN AN ORGANIZED HEALTH CARE EDUCATION/TRAINING PROGRAM

## 2024-08-23 PROCEDURE — 11981 INSERTION DRUG DLVR IMPLANT: CPT | Performed by: STUDENT IN AN ORGANIZED HEALTH CARE EDUCATION/TRAINING PROGRAM

## 2024-08-23 RX ORDER — LIDOCAINE HYDROCHLORIDE AND EPINEPHRINE 10; 10 MG/ML; UG/ML
20 INJECTION, SOLUTION INFILTRATION; PERINEURAL ONCE
Status: COMPLETED | OUTPATIENT
Start: 2024-08-23 | End: 2024-08-23

## 2024-08-23 RX ADMIN — LIDOCAINE HYDROCHLORIDE AND EPINEPHRINE 20 ML: 10; 10 INJECTION, SOLUTION INFILTRATION; PERINEURAL at 10:45

## 2024-08-23 RX ADMIN — ETONOGESTREL 68 MG: 68 IMPLANT SUBCUTANEOUS at 10:48

## 2024-08-23 ASSESSMENT — PATIENT HEALTH QUESTIONNAIRE - PHQ9
SUM OF ALL RESPONSES TO PHQ9 QUESTIONS 1 & 2: 0
SUM OF ALL RESPONSES TO PHQ QUESTIONS 1-9: 0
SUM OF ALL RESPONSES TO PHQ QUESTIONS 1-9: 0
2. FEELING DOWN, DEPRESSED OR HOPELESS: NOT AT ALL
SUM OF ALL RESPONSES TO PHQ QUESTIONS 1-9: 0
1. LITTLE INTEREST OR PLEASURE IN DOING THINGS: NOT AT ALL
SUM OF ALL RESPONSES TO PHQ QUESTIONS 1-9: 0

## 2024-08-23 NOTE — PROGRESS NOTES
[facility]  OFFICE PROCEDURE PROGRESS NOTE        Chart reviewed for the following:   Tate GARCIA MD, have reviewed the History, Physical and updated the Allergic reactions for Julia Wuo Reyes     TIME OUT performed immediately prior to start of procedure:   Tate GARCIA MD, have performed the following reviews on Julia Wuo Reyes prior to the start of the procedure:            * Patient was identified by name and date of birth   * Agreement on procedure being performed was verified  * Risks and Benefits explained to the patient  * Procedure site verified and marked as necessary  * Patient was positioned for comfort  * Consent was signed and verified     Time: 10:13 AM    Date of procedure: 8/23/2024    Procedure performed by:  Tate Anthony MD    Provider assisted by: Karlene Trevino LPN     Patient assisted by: self    How tolerated by patient: tolerated the procedure well with no complications    Post Procedural Pain Scale: 0 - No Hurt    Comments: none      The patient's left arm was selected and the proposed site marked with a sterile marking pen.  The site was cleaned with chloraprep x3.  The site was injected with 3mL 1% lidocaine from insertion to the full extent of the implant.  The insertion device was then used to elevated the skin and tunnel under the skin the full length of the implant.  The device was then deployed and withdrawn leaving the implant in place.  The implant was palpated to ensure proper placement.  The insertion device was inspected to insure that the entire device was deployed.     Guaze was placed over the incision and a pressure wrap was placed around the arm to reduce swelling overnight    The patient was given her information card and reminded that the device should be removed no later than three years and that contrceptive effectiveness was not guaranteed after that date.  The patient expressed understanding.      Tate Anthony

## 2024-08-23 NOTE — PROGRESS NOTES
Magaly Suleny Arevalo Reyes is a 28 y.o. female      Chief Complaint   Patient presents with    Procedure     Patient is coming in for a Nexplanon insertion. Recent delivery on 07/06/2024. LMP was October of last year. Last sexual intercourse was in may. She is breastfeeding. No other concerns.        \"Have you been to the ER, urgent care clinic since your last visit?  Hospitalized since your last visit?\"    NO    “Have you seen or consulted any other health care providers outside of [facility] since your last visit?”    NO              Vitals:    08/23/24 0947   BP: 113/77   Site: Right Upper Arm   Position: Sitting   Pulse: 87   Resp: 18   Temp: 97.8 °F (36.6 °C)   TempSrc: Oral   SpO2: 98%   Weight: 79.8 kg (176 lb)   Height: 1.57 m (5' 1.81\")            Health Maintenance Due   Topic Date Due    Varicella vaccine (1 of 2 - 13+ 2-dose series) Never done    Hepatitis B vaccine (1 of 3 - 19+ 3-dose series) Never done    DTaP/Tdap/Td vaccine (1 - Tdap) Never done    COVID-19 Vaccine (1 - 2023-24 season) Never done    Flu vaccine (1) 08/01/2024         Medication Reconciliation completed, changes noted.  Please  Update medication list.

## 2024-09-20 ENCOUNTER — PATIENT MESSAGE (OUTPATIENT)
Age: 29
End: 2024-09-20

## 2024-09-23 ENCOUNTER — TELEPHONE (OUTPATIENT)
Age: 29
End: 2024-09-23

## 2024-10-04 ENCOUNTER — OFFICE VISIT (OUTPATIENT)
Age: 29
End: 2024-10-04
Payer: MEDICAID

## 2024-10-04 VITALS
DIASTOLIC BLOOD PRESSURE: 69 MMHG | HEIGHT: 62 IN | SYSTOLIC BLOOD PRESSURE: 107 MMHG | WEIGHT: 176 LBS | HEART RATE: 78 BPM | BODY MASS INDEX: 32.39 KG/M2 | TEMPERATURE: 98.4 F

## 2024-10-04 DIAGNOSIS — Z23 IMMUNIZATION DUE: ICD-10-CM

## 2024-10-04 DIAGNOSIS — Z01.419 WELL WOMAN EXAM WITH ROUTINE GYNECOLOGICAL EXAM: Primary | ICD-10-CM

## 2024-10-04 DIAGNOSIS — A58 VAGINAL CUFF GRANULOMA: ICD-10-CM

## 2024-10-04 DIAGNOSIS — R30.0 DYSURIA: ICD-10-CM

## 2024-10-04 PROCEDURE — 90661 CCIIV3 VAC ABX FR 0.5 ML IM: CPT

## 2024-10-04 PROCEDURE — 99395 PREV VISIT EST AGE 18-39: CPT

## 2024-10-04 PROCEDURE — 99213 OFFICE O/P EST LOW 20 MIN: CPT

## 2024-10-04 RX ORDER — HYDROPHOR 42 G/100G
OINTMENT TOPICAL
Qty: 100 G | Refills: 0 | Status: SHIPPED | OUTPATIENT
Start: 2024-10-04

## 2024-10-04 RX ORDER — PNV NO.95/FERROUS FUM/FOLIC AC 28MG-0.8MG
1 TABLET ORAL DAILY
Qty: 90 TABLET | Refills: 3 | Status: SHIPPED | OUTPATIENT
Start: 2024-10-04

## 2024-10-04 NOTE — PROGRESS NOTES
Patient has been identified by name and .    Chief Complaint   Patient presents with    Annual Exam       Vitals:    10/04/24 1358   BP: 107/69   Site: Right Upper Arm   Position: Sitting   Cuff Size: Medium Adult   Pulse: 78   Temp: 98.4 °F (36.9 °C)   TempSrc: Oral   Weight: 79.8 kg (176 lb)   Height: 1.57 m (5' 1.81\")        \"Have you been to the ER, urgent care clinic since your last visit?  Hospitalized since your last visit?\"    NO    “Have you seen or consulted any other health care providers outside of [facility] since your last visit?”    NO              
(176 lb)   24 79.8 kg (176 lb)   24 79.7 kg (175 lb 12.8 oz)   24 88.9 kg (196 lb)       Physical Exam:  Constitutional: Well-developed and well-nourished  Eyes: Conjunctivae wnl, EOMI,  Cardiovascular: Regular rate and rhythm, S1 and S2 without murmur, rubs, or gallops.  Lungs: Normal inspiratory effort, CTA bilaterally, no wheezes/rhonchi/rales  Abdomen: Soft, non tender, and without distention. No rebound, guarding, masses or HSM.  :Perineum and external genitalia normal without rash. Vaginal introitus with 1mm erythematous granulation tissue at 6 o'clock position. Very tender on exam limiting speculum exam.  Extremities: No cyanosis, clubbing, erythema, nor edema. Distal pulses intact and symmetric.    A chaperone was offered to the patient during today's exam, Dr Frazier present.    Assessment and Plan:  27 yo F  s/p  with vaginal granuloma.    Vaginal cuff granuloma: Acute painful granuloma of vaginal introitus at site of laceration per pt, limiting further exam. No bleeding or signs of infection. Do not think she would tolerate topical silver nitrate at this time but may benefit if symptoms persist.   - barrier ointment for now,  - RTC in 3-4 weeks for silver nitrate if not improved     Diagnosis Orders   1. Well woman exam with routine gynecological exam  Prenatal Vit-Fe Fumarate-FA (PRENATAL VITAMINS) 28-0.8 MG TABS      2. Immunization due  Influenza, FLUCELVAX Trivalent, (age 6 mo+) IM, Preservative Free, 0.5mL      3. Vaginal cuff granuloma  NuSwab Vaginitis Plus (VG+) with Candida (Six Species)    NuSwab Vaginitis Plus (VG+) with Candida (Six Species)    mineral oil-hydrophilic petrolatum (HYDROPHOR) ointment      4. Dysuria  Urinalysis with Reflex to Culture          Immunizations per orders  Patient counseled about skin care, diet, supplements, prenatal vitamins, safe sex and exercise.    Follow-up: Return in about 4 weeks (around 2024) for vaginal granuloma -

## 2024-10-05 LAB
APPEARANCE UR: CLEAR
BACTERIA URNS QL MICRO: ABNORMAL /HPF
BILIRUB UR QL: NEGATIVE
CAOX CRY URNS QL MICRO: ABNORMAL
COLOR UR: ABNORMAL
EPITH CASTS URNS QL MICRO: ABNORMAL /LPF
GLUCOSE UR STRIP.AUTO-MCNC: NEGATIVE MG/DL
HGB UR QL STRIP: NEGATIVE
HYALINE CASTS URNS QL MICRO: ABNORMAL /LPF (ref 0–5)
KETONES UR QL STRIP.AUTO: NEGATIVE MG/DL
LEUKOCYTE ESTERASE UR QL STRIP.AUTO: NEGATIVE
NITRITE UR QL STRIP.AUTO: POSITIVE
PH UR STRIP: 5.5 (ref 5–8)
PROT UR STRIP-MCNC: NEGATIVE MG/DL
RBC #/AREA URNS HPF: ABNORMAL /HPF (ref 0–5)
SP GR UR REFRACTOMETRY: 1.03 (ref 1–1.03)
URINE CULTURE IF INDICATED: ABNORMAL
UROBILINOGEN UR QL STRIP.AUTO: 0.2 EU/DL (ref 0.2–1)
WBC URNS QL MICRO: ABNORMAL /HPF (ref 0–4)

## 2024-10-06 ENCOUNTER — TELEPHONE (OUTPATIENT)
Age: 29
End: 2024-10-06

## 2024-10-06 DIAGNOSIS — N30.00 ACUTE CYSTITIS WITHOUT HEMATURIA: Primary | ICD-10-CM

## 2024-10-06 RX ORDER — NITROFURANTOIN 25; 75 MG/1; MG/1
100 CAPSULE ORAL 2 TIMES DAILY
Qty: 10 CAPSULE | Refills: 0 | Status: SHIPPED | OUTPATIENT
Start: 2024-10-06 | End: 2024-10-11

## 2024-10-08 ENCOUNTER — TELEPHONE (OUTPATIENT)
Age: 29
End: 2024-10-08

## 2024-10-08 NOTE — TELEPHONE ENCOUNTER
Called pt using  service to discuss lab results. Discussed UTI, NuSwab still pending. Discussed antibiotic treatment, all questions answered and pt agreeable. Will follow up at next visit on 10/25.     Connie Lopez MD

## 2024-10-09 LAB
A VAGINAE DNA VAG QL NAA+PROBE: NORMAL SCORE
BVAB2 DNA VAG QL NAA+PROBE: NORMAL SCORE
C ALBICANS DNA VAG QL NAA+PROBE: NEGATIVE
C GLABRATA DNA VAG QL NAA+PROBE: NEGATIVE
C TRACH RRNA SPEC QL NAA+PROBE: NEGATIVE
CANDIDA KRUSEI: NEGATIVE
CANDIDA LUSITANIAE, NAA: NEGATIVE
CANDIDA PARAPSILOSIS/TROPICALIS: NEGATIVE
MEGA1 DNA VAG QL NAA+PROBE: NORMAL SCORE
N GONORRHOEA RRNA SPEC QL NAA+PROBE: NEGATIVE
T VAGINALIS RRNA SPEC QL NAA+PROBE: NEGATIVE

## 2024-10-25 ENCOUNTER — OFFICE VISIT (OUTPATIENT)
Age: 29
End: 2024-10-25
Payer: MEDICAID

## 2024-10-25 VITALS
OXYGEN SATURATION: 97 % | SYSTOLIC BLOOD PRESSURE: 133 MMHG | BODY MASS INDEX: 31.98 KG/M2 | HEART RATE: 86 BPM | RESPIRATION RATE: 18 BRPM | WEIGHT: 173.8 LBS | DIASTOLIC BLOOD PRESSURE: 87 MMHG | HEIGHT: 62 IN | TEMPERATURE: 97.1 F

## 2024-10-25 DIAGNOSIS — A58 VAGINAL CUFF GRANULOMA: Primary | ICD-10-CM

## 2024-10-25 PROCEDURE — 99213 OFFICE O/P EST LOW 20 MIN: CPT

## 2024-10-25 PROCEDURE — 17250 CHEM CAUT OF GRANLTJ TISSUE: CPT

## 2024-10-25 ASSESSMENT — PATIENT HEALTH QUESTIONNAIRE - PHQ9
SUM OF ALL RESPONSES TO PHQ QUESTIONS 1-9: 0
SUM OF ALL RESPONSES TO PHQ9 QUESTIONS 1 & 2: 0
SUM OF ALL RESPONSES TO PHQ QUESTIONS 1-9: 0
1. LITTLE INTEREST OR PLEASURE IN DOING THINGS: NOT AT ALL
2. FEELING DOWN, DEPRESSED OR HOPELESS: NOT AT ALL

## 2024-10-25 NOTE — PROGRESS NOTES
[address] [id number]   Office [phone], Fax [phone]  Subjective Magaly Suleny Arevalo Reyes is a 29 y.o. female who presents for vaginal cuff granuloma.    Vaginal cuff granuloma:   - Seen by me 10/4  - Dyspareunia unchanged since then, intermenstrual spotting is resolved  - Did not  barrier cream   - Denies any abnormal discharge, fevers/chills/sweats, no dysuria since completing macrobid  - Patient requests application of silver nitrite in clinic today, is aware of potential discomfort during application    Review of Systems   Per HPI otherwise negative    Medical History  History reviewed. No pertinent past medical history.    Medications  Current Outpatient Medications   Medication Sig    Zinc Oxide 10 % OINT Apply 1 each topically daily    Prenatal Vit-Fe Fumarate-FA (PRENATAL VITAMINS) 28-0.8 MG TABS Take 1 tablet by mouth daily     No current facility-administered medications for this visit.       Immunizations   Immunization History   Administered Date(s) Administered    Influenza, FLUARIX, FLULAVAL, FLUZONE (age 6 mo+) and AFLURIA, (age 3 y+), Quadv PF, 0.5mL 10/11/2022, 11/13/2023    Influenza, FLUCELVAX, (age 6 mo+) IM, Trivalent PF, 0.5mL 10/04/2024    TDaP, ADACEL (age 10y-64y), BOOSTRIX (age 10y+), IM, 0.5mL 04/25/2024       Allergies   No Known Allergies    Objective   Vital Signs  /87 (Site: Right Upper Arm, Position: Sitting, Cuff Size: Large Adult)   Pulse 86   Temp 97.1 °F (36.2 °C) (Temporal)   Resp 18   Ht 1.57 m (5' 1.81\")   Wt 78.8 kg (173 lb 12.8 oz)   LMP 09/20/2024 (Approximate)   SpO2 97%   BMI 31.98 kg/m²     Physical Examination  Physical Exam  Vitals reviewed. Exam conducted with a chaperone present.   Constitutional:       Appearance: Normal appearance.   Cardiovascular:      Rate and Rhythm: Normal rate and regular rhythm.      Heart sounds: Normal heart sounds.   Pulmonary:      Effort: Pulmonary effort is normal.

## 2024-10-25 NOTE — PROGRESS NOTES
Chief Complaint   Patient presents with    Vaginal Pain     Vitals:    10/25/24 1308   BP: 133/87   Site: Right Upper Arm   Position: Sitting   Cuff Size: Large Adult   Pulse: 86   Resp: 18   Temp: 97.1 °F (36.2 °C)   TempSrc: Temporal   SpO2: 97%   Weight: 78.8 kg (173 lb 12.8 oz)   Height: 1.57 m (5' 1.81\")     \"Have you been to the ER, urgent care clinic since your last visit?  Hospitalized since your last visit?\"    NO    “Have you seen or consulted any other health care providers outside of [facility] since your last visit?”    NO            Click Here for Release of Records Request